# Patient Record
Sex: MALE | Race: WHITE | NOT HISPANIC OR LATINO | Employment: OTHER | URBAN - NONMETROPOLITAN AREA
[De-identification: names, ages, dates, MRNs, and addresses within clinical notes are randomized per-mention and may not be internally consistent; named-entity substitution may affect disease eponyms.]

---

## 2017-02-16 DIAGNOSIS — I48.20 CHRONIC ATRIAL FIBRILLATION (HCC): ICD-10-CM

## 2017-03-23 ENCOUNTER — OFFICE VISIT (OUTPATIENT)
Dept: CARDIOLOGY | Facility: CLINIC | Age: 77
End: 2017-03-23
Payer: MEDICARE

## 2017-03-23 VITALS
DIASTOLIC BLOOD PRESSURE: 82 MMHG | HEART RATE: 85 BPM | SYSTOLIC BLOOD PRESSURE: 140 MMHG | WEIGHT: 256 LBS | OXYGEN SATURATION: 96 % | BODY MASS INDEX: 31.83 KG/M2 | HEIGHT: 75 IN

## 2017-03-23 DIAGNOSIS — I48.20 CHRONIC ATRIAL FIBRILLATION (HCC): ICD-10-CM

## 2017-03-23 DIAGNOSIS — Z79.01 CHRONIC ANTICOAGULATION: ICD-10-CM

## 2017-03-23 DIAGNOSIS — I10 ESSENTIAL HYPERTENSION: ICD-10-CM

## 2017-03-23 DIAGNOSIS — I25.2 HISTORY OF MI (MYOCARDIAL INFARCTION): ICD-10-CM

## 2017-03-23 DIAGNOSIS — N18.30 CKD (CHRONIC KIDNEY DISEASE) STAGE 3, GFR 30-59 ML/MIN (HCC): ICD-10-CM

## 2017-03-23 DIAGNOSIS — E66.9 OBESITY (BMI 30.0-34.9): ICD-10-CM

## 2017-03-23 DIAGNOSIS — I25.10 CORONARY ARTERY DISEASE INVOLVING NATIVE CORONARY ARTERY OF NATIVE HEART WITHOUT ANGINA PECTORIS: ICD-10-CM

## 2017-03-23 DIAGNOSIS — G47.34 NOCTURNAL HYPOXEMIA: ICD-10-CM

## 2017-03-23 PROCEDURE — 99214 OFFICE O/P EST MOD 30 MIN: CPT | Performed by: INTERNAL MEDICINE

## 2017-03-23 PROCEDURE — 1036F TOBACCO NON-USER: CPT | Performed by: INTERNAL MEDICINE

## 2017-03-23 PROCEDURE — 1101F PT FALLS ASSESS-DOCD LE1/YR: CPT | Mod: 8P | Performed by: INTERNAL MEDICINE

## 2017-03-23 PROCEDURE — G8598 ASA/ANTIPLAT THER USED: HCPCS | Performed by: INTERNAL MEDICINE

## 2017-03-23 PROCEDURE — G8484 FLU IMMUNIZE NO ADMIN: HCPCS | Performed by: INTERNAL MEDICINE

## 2017-03-23 PROCEDURE — G8419 CALC BMI OUT NRM PARAM NOF/U: HCPCS | Performed by: INTERNAL MEDICINE

## 2017-03-23 PROCEDURE — G8432 DEP SCR NOT DOC, RNG: HCPCS | Performed by: INTERNAL MEDICINE

## 2017-03-23 PROCEDURE — 4040F PNEUMOC VAC/ADMIN/RCVD: CPT | Performed by: INTERNAL MEDICINE

## 2017-03-23 RX ORDER — LISINOPRIL AND HYDROCHLOROTHIAZIDE 20; 12.5 MG/1; MG/1
1 TABLET ORAL DAILY
Qty: 90 TAB | Refills: 3 | Status: SHIPPED | OUTPATIENT
Start: 2017-03-23 | End: 2017-09-07

## 2017-03-23 NOTE — MR AVS SNAPSHOT
"        Steven Bianchi   3/23/2017 10:00 AM   Office Visit   MRN: [id number]    Department:  Heart [facility]   Dept Phone:  [phone]    Description:  Male : 1940   Provider:  Josep Penn MD,[facility]           Reason for Visit     Follow-Up           Allergies as of 3/23/2017     No Known Allergies      You were diagnosed with     Coronary artery disease involving native coronary artery of native heart without angina pectoris   [5286977]       History of MI (myocardial infarction)   [938443]       Chronic atrial fibrillation (CMS-HCC)   [241438]       Chronic anticoagulation   [509738]       CKD (chronic kidney disease) stage 3, GFR 30-59 ml/min   [016708]       Obesity (BMI 30.0-34.9)   [482032]       Nocturnal hypoxemia   [233509]       Essential hypertension   [8837917]         Vital Signs     Blood Pressure Pulse Height Weight Body Mass Index Oxygen Saturation    140/82 mmHg 85 1.905 m (6' 3\") 116.121 kg (256 lb) 32.00 kg/m2 96%    Smoking Status                   Never Smoker            Basic Information     Date Of Birth Sex Race Ethnicity Preferred Language    1940 Male White Non- English      Your appointments     Aug 01, 2017 10:00 AM   FOLLOW UP with Josep Penn MD,[facility] ([facility]    [address] [place] [id number] [phone]              Problem List              ICD-10-CM Priority Class Noted - Resolved    BPH (benign prostatic hypertrophy) with urinary retention N40.1, R33.8   9/10/2012 - Present    Essential hypertension I10   9/10/2012 - Present    Elevated PSA R97.20   10/12/2012 - Present    CKD (chronic kidney disease) N18.9   10/12/2012 - Present    Back pain M54.9   2013 - Present    Colon cancer screening Z12.11   2013 - Present    Prostate cancer screening Z12.5   2013 - Present    Dyspnea R06.00   2013 - Present    Hematuria, gross R31.0   3/27/2014 - Present "    Knee pain, bilateral M25.561, M25.562   10/15/2014 - Present    Abnormal EKG R94.31   7/27/2015 - Present    Chronic atrial fibrillation (CMS-HCC) I48.2   8/14/2015 - Present      Health Maintenance        Date Due Completion Dates    IMM DTaP/Tdap/Td Vaccine (1 - Tdap) 9/10/1959 ---    IMM ZOSTER VACCINE 9/10/2000 ---    IMM PNEUMOCOCCAL 65+ (ADULT) LOW/MEDIUM RISK SERIES (2 of 2 - PCV13) 3/27/2015 3/27/2014    IMM INFLUENZA (1) 9/1/2016 ---    COLONOSCOPY 8/19/2023 8/19/2013 (N/S), 6/1/2009 (N/S)    Override on 8/19/2013: (N/S)    Override on 6/1/2009: (N/S)            Current Immunizations     Pneumococcal polysaccharide vaccine (PPSV-23) 3/27/2014      Below and/or attached are the medications your provider expects you to take. Review all of your home medications and newly ordered medications with your provider and/or pharmacist. Follow medication instructions as directed by your provider and/or pharmacist. Please keep your medication list with you and share with your provider. Update the information when medications are discontinued, doses are changed, or new medications (including over-the-counter products) are added; and carry medication information at all times in the event of emergency situations     Allergies:  No Known Allergies          Medications  Valid as of: March 23, 2017 - 10:58 AM    Generic Name Brand Name Tablet Size Instructions for use    AmLODIPine Besylate (Tab) NORVASC 2.5 MG Take 1 Tab by mouth every day.        Aspirin (Tablet Delayed Response) EQ ASPIRIN ADULT LOW DOSE 81 MG TAKE ONE TABLET BY MOUTH ONCE DAILY        Atorvastatin Calcium (Tab) LIPITOR 10 MG Take 1 Tab by mouth every day.        Cholecalciferol (Tab) cholecalciferol 1000 UNIT Take 1,000 Units by mouth every day.        Doxazosin Mesylate (Tab) CARDURA 4 MG TAKE ONE TABLET BY MOUTH TWICE DAILY        Finasteride (Tab) PROSCAR 5 MG Take 5 mg by mouth every day.        K Phos [place]-Sod Phos Di & Mono (Tab) K-PHOS-NEUTRAL,  PHOSPHA 250 NEUTRAL 155-852-130 MG Take 1 Tab by mouth 2 times a day.        Lisinopril-Hydrochlorothiazide (Tab) PRINZIDE, ZESTORETIC 20-12.5 MG Take 1 Tab by mouth every day.        Potassium Chloride Shelby CR (Tab CR) K-DUR 10 MEQ Take 10 mEq by mouth every day.        Rivaroxaban (Tab) XARELTO 15 MG Take 1 Tab by mouth with dinner.        Triamterene-HCTZ (Tab) MAXZIDE-25/DYAZIDE 37.5-25 MG TAKE ONE TABLET BY MOUTH IN THE MORNING        .                 Medicines prescribed today were sent to:     [facility] [address] [place] [address] [facility]    [address] [facility] [address]    Phone: [phone] Fax: [phone]    Open 24 Hours?: No      Medication refill instructions:       If your prescription bottle indicates you have medication refills left, it is not necessary to call your provider’s office. Please contact your pharmacy and they will refill your medication.    If your prescription bottle indicates you do not have any refills left, you may request refills at any time through one of the following ways: The online [employer] system (except Urgent Care), by calling your provider’s office, or by asking your pharmacy to contact your provider’s office with a refill request. Medication refills are processed only during regular business hours and may not be available until the next business day. Your provider may request additional information or to have a follow-up visit with you prior to refilling your medication.   *Please Note: Medication refills are assigned a new Rx number when refilled electronically. Your pharmacy may indicate that no refills were authorized even though a new prescription for the same medication is available at the pharmacy. Please request the medicine by name with the pharmacy before contacting your provider for a refill.        Your To Do List     Future Labs/Procedures Complete By Expires    ECHOCARDIOGRAM COMP W/O CONT  As directed 3/23/2018         [employer] Status:  Patient Declined

## 2017-03-23 NOTE — PROGRESS NOTES
Chief Complaint   Patient presents with   • Follow-Up       This patient is an established male who is here today to discuss:  77 y/o male with CAD and prior MI with LVEF 40-45% and c/o chronic MAGALLON; HTN may be less well controlled; Using ACEI qod per Nephrologist    Patient Active Problem List    Diagnosis Date Noted   • Chronic atrial fibrillation (CMS-HCC) 08/14/2015   • Abnormal EKG 07/27/2015   • Knee pain, bilateral 10/15/2014   • Hematuria, gross 03/27/2014   • Prostate cancer screening 11/20/2013   • Dyspnea 11/20/2013   • Back pain 07/02/2013   • Colon cancer screening 07/02/2013   • Elevated PSA 10/12/2012   • CKD (chronic kidney disease) 10/12/2012   • BPH (benign prostatic hypertrophy) with urinary retention 09/10/2012   • Essential hypertension 09/10/2012       No past medical history on file.  Past Surgical History   Procedure Laterality Date   • Hemorrhoidectomy  1982   • Prostatectomy, radial  2005       Current Outpatient Prescriptions   Medication Sig Dispense Refill   • vitamin D (CHOLECALCIFEROL) 1000 UNIT Tab Take 1,000 Units by mouth every day.     • lisinopril-hydrochlorothiazide (PRINZIDE, ZESTORETIC) 20-12.5 MG per tablet Take 1 Tab by mouth every day. 90 Tab 3   • doxazosin (CARDURA) 4 MG Tab TAKE ONE TABLET BY MOUTH TWICE DAILY 180 Tab 1   • rivaroxaban (XARELTO) 15 MG Tab tablet Take 1 Tab by mouth with dinner. 90 Tab 3   • triamterene-hctz (MAXZIDE-25/DYAZIDE) 37.5-25 MG Tab TAKE ONE TABLET BY MOUTH IN THE MORNING 90 Tab 0   • EQ ASPIRIN ADULT LOW DOSE 81 MG EC tablet TAKE ONE TABLET BY MOUTH ONCE DAILY 90 Tab 3   • atorvastatin (LIPITOR) 10 MG Tab Take 1 Tab by mouth every day. 30 Tab 11   • amlodipine (NORVASC) 2.5 MG Tab Take 1 Tab by mouth every day. 90 Tab 3   • potassium chloride SA (K-DUR) 10 MEQ TBCR Take 10 mEq by mouth every day.     • finasteride (PROSCAR) 5 MG TABS Take 5 mg by mouth every day.     • phosphorus (PHOSPHA 250 NEUTRAL) 155-852-130 MG tablet Take 1 Tab by mouth 2  "times a day.       No current facility-administered medications for this visit.     Review of patient's allergies indicates no known allergies.      Review of Systems:     Constitutional: Denies fevers, Denies weight changes  Eyes: Denies changes in vision, no eye pain  Ears/Nose/Throat/Mouth: Denies nasal congestion or sore throat   Cardiovascular: Denies chest pain or palpitations   Respiratory: Denies shortness of breath , Denies cough  Gastrointestinal/Hepatic: Denies abdominal pain, nausea, vomiting, diarrhea, constipation or GI bleeding   Genitourinary: Denies bladder dysfunction, dysuria or frequency  Musculoskeletal/Rheum:   joint pain and swelling   Skin/Breast: Denies rash, denies breast lumps or discharge  Neurological: Denies headache, confusion, memory loss or focal weakness/parasthesias  Psychiatric: denies mood disorder   Endocrine: MetSyn X;  hx of diabetes or thyroid dysfunction  Heme/Oncology/Lymph Nodes: Denies enlarged lymph nodes, denies brusing or known bleeding disorder  Allergic/Immunologic: Denies hx of allergies      All other systems were reviewed and are negative (AMA/CMS criteria)      Blood pressure 140/82, pulse 85, height 1.905 m (6' 3\"), weight 116.121 kg (256 lb), SpO2 96 %.  General Appearance: Well developed, Well nourished, No acute distress, Non-toxic appearance.    HENT: Normocephalic, Atraumatic, Oropharynx moist mucous membranes, Dentition: Mallampati 4 OP, Nose normal.    Eyes: PERRLA, EOMI, Conjunctiva normal, No discharge.  Neck: Normal range of motion, No cervical tenderness, Supple, No stridor, no JVD 6.5 cm. No thyromegaly. No carotid bruit.  Cardiovascular: Normal heart rate, ABNormal IRR rhythm, nl S1, S2, no S3, S4; No gallops; No murmurs, No rubs, . Extremitites with intact distal pulses, no cyanosis, clubbing but trace LE edema. No heaves, thrills, HJR;  Peripheral pulses: carotid 2+, brachial 2+, radial 2+, ulnar 2+, femoral 2+, popliteal 1+, PT 1+, DP 1+;  Lungs: " Respiratory effort is normal. Normal breath sounds, breath sounds clear to auscultation bilaterally, no rales, no rhonchi, no wheezing.    Abdomen: Bowel sounds normal, Soft, No tenderness, No guarding, No rebound, No masses, No hepatosplenomegaly.  Skin: LE pre-tibial discoloration; Warm, Dry, No erythema, No rash, no induration or crepitus.  Neurologic: Numbness of feet; Alert & oriented x 3, Normal motor function, Normal sensory function, No focal deficits noted,  normal gait.  Psychiatric: Affect normal, Judgment normal, Mood normal    Results for MERCEDEZ DAILY (MRN [id number]) as of 3/23/2017 10:26   Ref. Range 8/19/2016 07:25 11/4/2016 10:40 12/15/2016 09:15 3/3/2017 09:40 3/16/2017 13:53   WBC Latest Ref Range: 4.8-10.8 K/uL    7.6    RBC Latest Ref Range: 4.70-6.10 M/uL    5.70    Hemoglobin Latest Ref Range: 14.0-18.0 g/dL    17.1    Hematocrit Latest Ref Range: 42.0-52.0 %    52.1 (H)    MCV Latest Ref Range: 80.0-94.0 fL    91.4    MCH Latest Ref Range: 27.0-31.0 pg    30.0    MCHC Latest Ref Range: 33.0-37.0 g/dL    32.8 (L)    RDW Latest Ref Range: 11.5-14.5 %    14.0    Platelet Count Latest Ref Range: 130-400 K/uL    191    MPV Latest Ref Range: 7.4-10.4 fL    9.4    Neutrophils Automated Latest Ref Range: 39.0-70.0 %    71.2 (H)    Abs Neutrophils Automated Latest Ref Range: 1.8-7.7 K/uL    5.5    Lymphocytes Automated Latest Ref Range: 21.0-50.0 %    20.3 (L)    Abs Lymph Automated Latest Ref Range: 1.2-4.8 K/uL    1.6    Eosinophils Automated Latest Ref Range: 0.0-5.0 %    0.8    Basophils Automated Latest Ref Range: 0.0-3.0 %    0.3    Monocytes Automated Latest Ref Range: 2.0-9.0 %    7.1    Eosinophil Count, Blood Latest Ref Range: 0.00-0.50 K/uL    0.06    Sodium Latest Ref Range: 136-145 mmol/L  141  141 140   Potassium Latest Ref Range: 3.5-5.1 mmol/L  3.6  4.2 3.8   Chloride Latest Ref Range:  mmol/L  104  105 105   Co2 Latest Ref Range: 21-32 mmol/L  23  29 26   Anion Gap Latest Ref  Range: 10-18 mmol/L     13   Glucose Latest Ref Range: 74-99 mg/dL  131 (H)  104 (H) 106 (H)   Bun Latest Ref Range: 7-18 mg/dL  24 (H)  26 (H) 29 (H)   Creatinine Latest Ref Range: 0.8-1.3 mg/dL  1.4 (H)  1.5 (H) 1.6 (H)   GFR If  Latest Ref Range: >60 mL/min/1.73 m 2  60  55 (A) 51 (A)   GFR If Non  Latest Ref Range: >60 mL/min/1.73 m 2  49 (A)  45 (A) 42 (A)   Calcium Latest Ref Range: 8.5-11.0 mg/dL  8.9  9.2 9.4   Albumin Latest Ref Range: 3.4-5.0 g/dL  3.7  3.9    Phosphorus Latest Ref Range: 2.6-4.7 mg/dL  2.3 (L)  2.4 (L)    Magnesium Latest Ref Range: 1.8-2.4 mg/dL    2.0    Uric Acid Latest Ref Range: 3.5-8.5 mg/dL  6.2      Glycohemoglobin Latest Ref Range: <6.0 % 5.2       Estim. Avg Glu Latest Units: mg/dL 103       Cholesterol,Tot Latest Ref Range: 120-200 mg/dL   149     Triglycerides Latest Ref Range: 0-150 mg/dL   74     HDL Latest Ref Range: 40.0-60.0 mg/dL   57.0     Non HDL Cholesterol Latest Ref Range:     92     LDL Latest Ref Range: <100 mg/dL   77     Chol-Hdl Ratio Unknown   2.61       Assessment and Plan.   76 y.o. male has above mentioned; Will change his BP med; other option will be increase Amlodipine; Will see hid home BP in 3 months;     1. Coronary artery disease involving native coronary artery of native heart without angina pectoris    - ECHOCARDIOGRAM COMP W/O CONT; Future    2. History of MI (myocardial infarction)    - ECHOCARDIOGRAM COMP W/O CONT; Future    3. Chronic atrial fibrillation (CMS-HCC)  Discussed risks and educated about the subject related matters      4. Chronic anticoagulation  No bleeding    5. CKD (chronic kidney disease) stage 3, GFR 30-59 ml/min  Reviewed and may take Lisinopril/HCVT qod as per his Nephrologist    6. Obesity (BMI 30.0-34.9)  stable    7. Nocturnal hypoxemia  O2 suppl; discussed possible sleep apnea but he wants to quit    8. Essential hypertension    - lisinopril-hydrochlorothiazide (PRINZIDE, ZESTORETIC)  20-12.5 MG per tablet; Take 1 Tab by mouth every day.  Dispense: 90 Tab; Refill: 3      1. Coronary artery disease involving native coronary artery of native heart without angina pectoris  ECHOCARDIOGRAM COMP W/O CONT   2. History of MI (myocardial infarction)  ECHOCARDIOGRAM COMP W/O CONT   3. Chronic atrial fibrillation (CMS-[facility])     4. Chronic anticoagulation     5. CKD (chronic kidney disease) stage 3, GFR 30-59 ml/min     6. Obesity (BMI 30.0-34.9)     7. Nocturnal hypoxemia     8. Essential hypertension  lisinopril-hydrochlorothiazide (PRINZIDE, ZESTORETIC) 20-12.5 MG per tablet

## 2017-03-23 NOTE — Clinical Note
[facility] [address],   [address]nd Floor  [place] [address]  Phone: [phone]  Fax: [phone]              Steven Bianchi  1940    Encounter Date: 3/23/2017    Torey Apodaca M.D.    Thank you for the referral. I had the pleasure of seeing Steven Bianchi today in cardiology clinic. I've attached my visit note below. If you have any questions please feel free to give me a call anytime.      Josep Penn MD, PhD, [facility]  Cardiology and Lipidology  [facility]                                                                PROGRESS NOTE:  Chief Complaint   Patient presents with   • Follow-Up       This patient is an established male who is here today to discuss:  77 y/o male with CAD and prior MI with LVEF 40-45% and c/o chronic MAGALLON; HTN may be less well controlled; Using ACEI qod per Nephrologist    Patient Active Problem List    Diagnosis Date Noted   • Chronic atrial fibrillation (CMS-HCC) 08/14/2015   • Abnormal EKG 07/27/2015   • Knee pain, bilateral 10/15/2014   • Hematuria, gross 03/27/2014   • Prostate cancer screening 11/20/2013   • Dyspnea 11/20/2013   • Back pain 07/02/2013   • Colon cancer screening 07/02/2013   • Elevated PSA 10/12/2012   • CKD (chronic kidney disease) 10/12/2012   • BPH (benign prostatic hypertrophy) with urinary retention 09/10/2012   • Essential hypertension 09/10/2012       No past medical history on file.  Past Surgical History   Procedure Laterality Date   • Hemorrhoidectomy  1982   • Prostatectomy, radial  2005       Current Outpatient Prescriptions   Medication Sig Dispense Refill   • vitamin D (CHOLECALCIFEROL) 1000 UNIT Tab Take 1,000 Units by mouth every day.     • lisinopril-hydrochlorothiazide (PRINZIDE, ZESTORETIC) 20-12.5 MG per tablet Take 1 Tab by mouth every day. 90 Tab 3   • doxazosin (CARDURA) 4 MG Tab TAKE ONE TABLET BY MOUTH TWICE DAILY 180 Tab 1   •  "rivaroxaban (XARELTO) 15 MG Tab tablet Take 1 Tab by mouth with dinner. 90 Tab 3   • triamterene-hctz (MAXZIDE-25/DYAZIDE) 37.5-25 MG Tab TAKE ONE TABLET BY MOUTH IN THE MORNING 90 Tab 0   • EQ ASPIRIN ADULT LOW DOSE 81 MG EC tablet TAKE ONE TABLET BY MOUTH ONCE DAILY 90 Tab 3   • atorvastatin (LIPITOR) 10 MG Tab Take 1 Tab by mouth every day. 30 Tab 11   • amlodipine (NORVASC) 2.5 MG Tab Take 1 Tab by mouth every day. 90 Tab 3   • potassium chloride SA (K-DUR) 10 MEQ TBCR Take 10 mEq by mouth every day.     • finasteride (PROSCAR) 5 MG TABS Take 5 mg by mouth every day.     • phosphorus (PHOSPHA 250 NEUTRAL) 155-852-130 MG tablet Take 1 Tab by mouth 2 times a day.       No current facility-administered medications for this visit.     Review of patient's allergies indicates no known allergies.      Review of Systems:     Constitutional: Denies fevers, Denies weight changes  Eyes: Denies changes in vision, no eye pain  Ears/Nose/Throat/Mouth: Denies nasal congestion or sore throat   Cardiovascular: Denies chest pain or palpitations   Respiratory: Denies shortness of breath , Denies cough  Gastrointestinal/Hepatic: Denies abdominal pain, nausea, vomiting, diarrhea, constipation or GI bleeding   Genitourinary: Denies bladder dysfunction, dysuria or frequency  Musculoskeletal/Rheum:   joint pain and swelling   Skin/Breast: Denies rash, denies breast lumps or discharge  Neurological: Denies headache, confusion, memory loss or focal weakness/parasthesias  Psychiatric: denies mood disorder   Endocrine: MetSyn X;  hx of diabetes or thyroid dysfunction  Heme/Oncology/Lymph Nodes: Denies enlarged lymph nodes, denies brusing or known bleeding disorder  Allergic/Immunologic: Denies hx of allergies      All other systems were reviewed and are negative (AMA/CMS criteria)      Blood pressure 140/82, pulse 85, height 1.905 m (6' 3\"), weight 116.121 kg (256 lb), SpO2 96 %.  General Appearance: Well developed, Well nourished, No " acute distress, Non-toxic appearance.    HENT: Normocephalic, Atraumatic, Oropharynx moist mucous membranes, Dentition: Mallampati 4 OP, Nose normal.    Eyes: PERRLA, EOMI, Conjunctiva normal, No discharge.  Neck: Normal range of motion, No cervical tenderness, Supple, No stridor, no JVD 6.5 cm. No thyromegaly. No carotid bruit.  Cardiovascular: Normal heart rate, ABNormal IRR rhythm, nl S1, S2, no S3, S4; No gallops; No murmurs, No rubs, . Extremitites with intact distal pulses, no cyanosis, clubbing but trace LE edema. No heaves, thrills, HJR;  Peripheral pulses: carotid 2+, brachial 2+, radial 2+, ulnar 2+, femoral 2+, popliteal 1+, PT 1+, DP 1+;  Lungs: Respiratory effort is normal. Normal breath sounds, breath sounds clear to auscultation bilaterally, no rales, no rhonchi, no wheezing.    Abdomen: Bowel sounds normal, Soft, No tenderness, No guarding, No rebound, No masses, No hepatosplenomegaly.  Skin: LE pre-tibial discoloration; Warm, Dry, No erythema, No rash, no induration or crepitus.  Neurologic: Numbness of feet; Alert & oriented x 3, Normal motor function, Normal sensory function, No focal deficits noted,  normal gait.  Psychiatric: Affect normal, Judgment normal, Mood normal    Results for MERCEDEZ DAILY (MRN [id number]) as of 3/23/2017 10:26   Ref. Range 8/19/2016 07:25 11/4/2016 10:40 12/15/2016 09:15 3/3/2017 09:40 3/16/2017 13:53   WBC Latest Ref Range: 4.8-10.8 K/uL    7.6    RBC Latest Ref Range: 4.70-6.10 M/uL    5.70    Hemoglobin Latest Ref Range: 14.0-18.0 g/dL    17.1    Hematocrit Latest Ref Range: 42.0-52.0 %    52.1 (H)    MCV Latest Ref Range: 80.0-94.0 fL    91.4    MCH Latest Ref Range: 27.0-31.0 pg    30.0    MCHC Latest Ref Range: 33.0-37.0 g/dL    32.8 (L)    RDW Latest Ref Range: 11.5-14.5 %    14.0    Platelet Count Latest Ref Range: 130-400 K/uL    191    MPV Latest Ref Range: 7.4-10.4 fL    9.4    Neutrophils Automated Latest Ref Range: 39.0-70.0 %    71.2 (H)    Abs Neutrophils  Automated Latest Ref Range: 1.8-7.7 K/uL    5.5    Lymphocytes Automated Latest Ref Range: 21.0-50.0 %    20.3 (L)    Abs Lymph Automated Latest Ref Range: 1.2-4.8 K/uL    1.6    Eosinophils Automated Latest Ref Range: 0.0-5.0 %    0.8    Basophils Automated Latest Ref Range: 0.0-3.0 %    0.3    Monocytes Automated Latest Ref Range: 2.0-9.0 %    7.1    Eosinophil Count, Blood Latest Ref Range: 0.00-0.50 K/uL    0.06    Sodium Latest Ref Range: 136-145 mmol/L  141  141 140   Potassium Latest Ref Range: 3.5-5.1 mmol/L  3.6  4.2 3.8   Chloride Latest Ref Range:  mmol/L  104  105 105   Co2 Latest Ref Range: 21-32 mmol/L  23  29 26   Anion Gap Latest Ref Range: 10-18 mmol/L     13   Glucose Latest Ref Range: 74-99 mg/dL  131 (H)  104 (H) 106 (H)   Bun Latest Ref Range: 7-18 mg/dL  24 (H)  26 (H) 29 (H)   Creatinine Latest Ref Range: 0.8-1.3 mg/dL  1.4 (H)  1.5 (H) 1.6 (H)   GFR If  Latest Ref Range: >60 mL/min/1.73 m 2  60  55 (A) 51 (A)   GFR If Non  Latest Ref Range: >60 mL/min/1.73 m 2  49 (A)  45 (A) 42 (A)   Calcium Latest Ref Range: 8.5-11.0 mg/dL  8.9  9.2 9.4   Albumin Latest Ref Range: 3.4-5.0 g/dL  3.7  3.9    Phosphorus Latest Ref Range: 2.6-4.7 mg/dL  2.3 (L)  2.4 (L)    Magnesium Latest Ref Range: 1.8-2.4 mg/dL    2.0    Uric Acid Latest Ref Range: 3.5-8.5 mg/dL  6.2      Glycohemoglobin Latest Ref Range: <6.0 % 5.2       Estim. Avg Glu Latest Units: mg/dL 103       Cholesterol,Tot Latest Ref Range: 120-200 mg/dL   149     Triglycerides Latest Ref Range: 0-150 mg/dL   74     HDL Latest Ref Range: 40.0-60.0 mg/dL   57.0     Non HDL Cholesterol Latest Ref Range:     92     LDL Latest Ref Range: <100 mg/dL   77     Chol-Hdl Ratio Unknown   2.61       Assessment and Plan.   76 y.o. male has above mentioned; Will change his BP med; other option will be increase Amlodipine; Will see hid home BP in 3 months;     1. Coronary artery disease involving native coronary artery  of native heart without angina pectoris    - ECHOCARDIOGRAM COMP W/O CONT; Future    2. History of MI (myocardial infarction)    - ECHOCARDIOGRAM COMP W/O CONT; Future    3. Chronic atrial fibrillation (CMS-[facility])  Discussed risks and educated about the subject related matters      4. Chronic anticoagulation  No bleeding    5. CKD (chronic kidney disease) stage 3, GFR 30-59 ml/min  Reviewed and may take Lisinopril/HCVT qod as per his Nephrologist    6. Obesity (BMI 30.0-34.9)  stable    7. Nocturnal hypoxemia  O2 suppl; discussed possible sleep apnea but he wants to quit    8. Essential hypertension    - lisinopril-hydrochlorothiazide (PRINZIDE, ZESTORETIC) 20-12.5 MG per tablet; Take 1 Tab by mouth every day.  Dispense: 90 Tab; Refill: 3      1. Coronary artery disease involving native coronary artery of native heart without angina pectoris  ECHOCARDIOGRAM COMP W/O CONT   2. History of MI (myocardial infarction)  ECHOCARDIOGRAM COMP W/O CONT   3. Chronic atrial fibrillation (CMS-[facility])     4. Chronic anticoagulation     5. CKD (chronic kidney disease) stage 3, GFR 30-59 ml/min     6. Obesity (BMI 30.0-34.9)     7. Nocturnal hypoxemia     8. Essential hypertension  lisinopril-hydrochlorothiazide (PRINZIDE, ZESTORETIC) 20-12.5 MG per tablet             Torey Apodaca M.D.  [address]  [facility] [place] [address]  VIA In Basket

## 2017-08-01 ENCOUNTER — OFFICE VISIT (OUTPATIENT)
Dept: CARDIOLOGY | Facility: CLINIC | Age: 77
End: 2017-08-01
Payer: MEDICARE

## 2017-08-01 VITALS
SYSTOLIC BLOOD PRESSURE: 118 MMHG | OXYGEN SATURATION: 97 % | DIASTOLIC BLOOD PRESSURE: 70 MMHG | HEIGHT: 75 IN | BODY MASS INDEX: 31.58 KG/M2 | HEART RATE: 89 BPM | WEIGHT: 254 LBS

## 2017-08-01 DIAGNOSIS — E66.9 OBESITY (BMI 30.0-34.9): ICD-10-CM

## 2017-08-01 DIAGNOSIS — I48.20 CHRONIC ATRIAL FIBRILLATION (HCC): ICD-10-CM

## 2017-08-01 DIAGNOSIS — N18.30 CKD (CHRONIC KIDNEY DISEASE) STAGE 3, GFR 30-59 ML/MIN (HCC): ICD-10-CM

## 2017-08-01 DIAGNOSIS — I25.2 HISTORY OF MI (MYOCARDIAL INFARCTION): ICD-10-CM

## 2017-08-01 DIAGNOSIS — I10 ESSENTIAL HYPERTENSION, BENIGN: ICD-10-CM

## 2017-08-01 DIAGNOSIS — I25.10 CORONARY ARTERY DISEASE INVOLVING NATIVE CORONARY ARTERY OF NATIVE HEART WITHOUT ANGINA PECTORIS: ICD-10-CM

## 2017-08-01 DIAGNOSIS — Z79.01 CHRONIC ANTICOAGULATION: ICD-10-CM

## 2017-08-01 PROCEDURE — 99214 OFFICE O/P EST MOD 30 MIN: CPT | Performed by: INTERNAL MEDICINE

## 2017-08-01 NOTE — Clinical Note
[facility] [address],   [address]  [place] [address]  Phone: [phone]  Fax: [phone]              Steven Bianchi  1940    Encounter Date: 8/1/2017    Torey Apodaca M.D.    Thank you for the referral. I had the pleasure of seeing Steven Bianchi today in cardiology clinic. I've attached my visit note below. If you have any questions please feel free to give me a call anytime.      Josep Penn MD, PhD, [facility]  Cardiology and Lipidology  [facility]                                                                    PROGRESS NOTE:  Chief Complaint   Patient presents with   • Follow-Up     6 month f/u HTN   PAF    This patient is an established male who is here today to discuss:  75 y/o male with CAD and prior MI with LVEF 40-45% and c/o chronic MAGALLON; HTN may be less well controlled; Using ACEI qod per Nephrologist    Patient Active Problem List    Diagnosis Date Noted   • Chronic atrial fibrillation (CMS-HCC) 08/14/2015   • Abnormal EKG 07/27/2015   • Knee pain, bilateral 10/15/2014   • Hematuria, gross 03/27/2014   • Prostate cancer screening 11/20/2013   • Dyspnea 11/20/2013   • Back pain 07/02/2013   • Colon cancer screening 07/02/2013   • Elevated PSA 10/12/2012   • CKD (chronic kidney disease) 10/12/2012   • BPH (benign prostatic hypertrophy) with urinary retention 09/10/2012   • Essential hypertension 09/10/2012       History reviewed. No pertinent past medical history.  Past Surgical History   Procedure Laterality Date   • Hemorrhoidectomy  1982   • Prostatectomy, radial  2005     Social History     Social History   • Marital Status:      Spouse Name: N/A   • Number of Children: N/A   • Years of Education: N/A     Social History Main Topics   • Smoking status: Never Smoker    • Smokeless tobacco: Never Used   • Alcohol Use: 1.0 oz/week     2 drink(s) per week   • Drug Use: No   •  Sexual Activity: Not Asked     Other Topics Concern   • None     Social History Narrative     Family History   Problem Relation Age of Onset   • Cancer Mother    • Heart Disease Father    • Cancer Sister    • Cancer Brother        Current Outpatient Prescriptions   Medication Sig Dispense Refill   • amlodipine (NORVASC) 2.5 MG Tab TAKE ONE TABLET BY MOUTH ONCE DAILY 90 Tab 2   • vitamin D (CHOLECALCIFEROL) 1000 UNIT Tab Take 1,000 Units by mouth every day.     • lisinopril-hydrochlorothiazide (PRINZIDE, ZESTORETIC) 20-12.5 MG per tablet Take 1 Tab by mouth every day. 90 Tab 3   • doxazosin (CARDURA) 4 MG Tab TAKE ONE TABLET BY MOUTH TWICE DAILY 180 Tab 1   • rivaroxaban (XARELTO) 15 MG Tab tablet Take 1 Tab by mouth with dinner. 90 Tab 3   • triamterene-hctz (MAXZIDE-25/DYAZIDE) 37.5-25 MG Tab TAKE ONE TABLET BY MOUTH IN THE MORNING 90 Tab 0   • EQ ASPIRIN ADULT LOW DOSE 81 MG EC tablet TAKE ONE TABLET BY MOUTH ONCE DAILY 90 Tab 3   • atorvastatin (LIPITOR) 10 MG Tab Take 1 Tab by mouth every day. 30 Tab 11   • potassium chloride SA (K-DUR) 10 MEQ TBCR Take 10 mEq by mouth every day.     • finasteride (PROSCAR) 5 MG TABS Take 5 mg by mouth every day.     • phosphorus (PHOSPHA 250 NEUTRAL) 155-852-130 MG tablet Take 1 Tab by mouth 2 times a day.       No current facility-administered medications for this visit.     Review of patient's allergies indicates no known allergies.    Review of Systems:     Constitutional: Denies fevers, Denies weight changes  Eyes: Denies changes in vision, no eye pain  Ears/Nose/Throat/Mouth: Denies nasal congestion or sore throat   Cardiovascular: Denies chest pain or palpitations   Respiratory: Denies shortness of breath , Denies cough  Gastrointestinal/Hepatic: Denies abdominal pain, nausea, vomiting, diarrhea, constipation or GI bleeding   Genitourinary: Denies bladder dysfunction, dysuria or frequency  Musculoskeletal/Rheum: Denies  joint pain and swelling   Skin/Breast: Denies rash,  "denies breast lumps or discharge  Neurological: Denies headache, confusion, memory loss or focal weakness/parasthesias  Psychiatric: denies mood disorder   Endocrine: denies hx of diabetes or thyroid dysfunction  Heme/Oncology/Lymph Nodes: Denies enlarged lymph nodes, denies brusing or known bleeding disorder  Allergic/Immunologic: Denies hx of allergies      All other systems were reviewed and are negative (AMA/CMS criteria)      Blood pressure 118/70, pulse 89, height 1.905 m (6' 3\"), weight 115.214 kg (254 lb), SpO2 97 %.  General Appearance: Well developed, Well nourished, No acute distress, Non-toxic appearance.    HENT: Normocephalic, Atraumatic, Oropharynx moist mucous membranes, Dentition: Mallampati 4 OP, Nose normal.    Eyes: PERRLA, EOMI, Conjunctiva normal, No discharge.  Neck: Normal range of motion, No cervical tenderness, Supple, No stridor, no JVD 6.5 cm. No thyromegaly. No carotid bruit.  Cardiovascular: Normal heart rate, ABNormal IRR rhythm, nl S1, S2, no S3, S4; No gallops; No murmurs, No rubs, . Extremitites with intact distal pulses, no cyanosis, clubbing but trace LE edema. No heaves, thrills, HJR;  Peripheral pulses: carotid 2+, brachial 2+, radial 2+, ulnar 2+, femoral 2+, popliteal 1+, PT 1+, DP 1+;  Lungs: Respiratory effort is normal. Normal breath sounds, breath sounds clear to auscultation bilaterally, no rales, no rhonchi, no wheezing.    Abdomen: Bowel sounds normal, Soft, No tenderness, No guarding, No rebound, No masses, No hepatosplenomegaly.  Skin: LE pre-tibial discoloration; Warm, Dry, No erythema, No rash, no induration or crepitus.  Neurologic: Numbness of feet; Alert & oriented x 3, Normal motor function, Normal sensory function, No focal deficits noted,  normal gait.  Psychiatric: Affect normal, Judgment normal, Mood normal    Results for MERCEDEZ DAILY (MRN [id number]) as of 8/1/2017 10:11   Ref. Range 3/3/2017 09:40 3/16/2017 13:53 7/10/2017 09:25 7/17/2017 00:00 7/24/2017 " 10:46   WBC Latest Ref Range: 4.8-10.8 K/uL 7.6       RBC Latest Ref Range: 4.70-6.10 M/uL 5.70       Hemoglobin Latest Ref Range: 14.0-18.0 g/dL 17.1       Hematocrit Latest Ref Range: 42.0-52.0 % 52.1 (H)       MCV Latest Ref Range: 80.0-94.0 fL 91.4       MCH Latest Ref Range: 27.0-31.0 pg 30.0       MCHC Latest Ref Range: 33.0-37.0 g/dL 32.8 (L)       RDW Latest Ref Range: 11.5-14.5 % 14.0       Platelet Count Latest Ref Range: 130-400 K/uL 191       MPV Latest Ref Range: 7.4-10.4 fL 9.4       Neutrophils Automated Latest Ref Range: 39.0-70.0 % 71.2 (H)       Abs Neutrophils Automated Latest Ref Range: 1.8-7.7 K/uL 5.5       Lymphocytes Automated Latest Ref Range: 21.0-50.0 % 20.3 (L)       Abs Lymph Automated Latest Ref Range: 1.2-4.8 K/uL 1.6       Eosinophils Automated Latest Ref Range: 0.0-5.0 % 0.8       Basophils Automated Latest Ref Range: 0.0-3.0 % 0.3       Monocytes Automated Latest Ref Range: 2.0-9.0 % 7.1       Eosinophil Count, Blood Latest Ref Range: 0.00-0.50 K/uL 0.06       Sodium Latest Ref Range: 136-145 mmol/L 141 140 140  140   Potassium Latest Ref Range: 3.5-5.1 mmol/L 4.2 3.8 4.0  3.9   Chloride Latest Ref Range:  mmol/L 105 105 103  104   Co2 Latest Ref Range: 21-32 mmol/L 29 26 27  25   Anion Gap Latest Ref Range: 10-18 mmol/L  13      Glucose Latest Ref Range: 74-99 mg/dL 104 (H) 106 (H) 101 (H)  108 (H)   Bun Latest Ref Range: 7-18 mg/dL 26 (H) 29 (H) 29 (H)  29 (H)   Creatinine Latest Ref Range: 0.8-1.3 mg/dL 1.5 (H) 1.6 (H) 1.7 (H)  1.7 (H)   GFR If  Latest Ref Range: >60 mL/min/1.73 m 2 55 (A) 51 (A) 48 (A)  48 (A)   GFR If Non  Latest Ref Range: >60 mL/min/1.73 m 2 45 (A) 42 (A) 39 (A)  39 (A)   Calcium Latest Ref Range: 8.5-11.0 mg/dL 9.2 9.4 9.5  9.6   Albumin Latest Ref Range: 3.4-5.0 g/dL 3.9  3.9  3.9   Phosphorus Latest Ref Range: 2.6-4.7 mg/dL 2.4 (L)  3.2  2.4 (L)   Magnesium Latest Ref Range: 1.8-2.4 mg/dL 2.0       Uric Acid Latest Ref  Range: 3.5-8.5 mg/dL   8.1       Assessment and Plan.   76 y.o. male has some hay fever with some pressure in his head but o/w doing well; Rare palpitation, fleeting chest discomfort;   Abn echo showed RWMA; LVEF 50% and enlarged ascending aorta 3.8 cm;  Pls see orders for ischemia w/u's     1. Coronary artery disease involving native coronary artery of native heart without angina pectoris  See above    2. History of MI (myocardial infarction)  See above    3. Chronic atrial fibrillation (CMS-HCC)  Rare palpitation    4. Chronic anticoagulation  No bleeding    5. Essential hypertension, benign  controlled    6. CKD (chronic kidney disease) stage 3, GFR 30-59 ml/min  reviewed    7. Obesity (BMI 30.0-34.9)  stable      Return to clinic in  2  months    1. Coronary artery disease involving native coronary artery of native heart without angina pectoris     2. History of MI (myocardial infarction)     3. Chronic atrial fibrillation (CMS-HCC)     4. Chronic anticoagulation     5. Essential hypertension, benign     6. CKD (chronic kidney disease) stage 3, GFR 30-59 ml/min     7. Obesity (BMI 30.0-34.9)               Torey Apodaca M.D.  [address]  [facility] [address]  VIA In Basket

## 2017-08-01 NOTE — PROGRESS NOTES
Chief Complaint   Patient presents with   • Follow-Up     6 month f/u HTN   PAF    This patient is an established male who is here today to discuss:  77 y/o male with CAD and prior MI with LVEF 40-45% and c/o chronic MAGALLON; HTN may be less well controlled; Using ACEI qod per Nephrologist    Patient Active Problem List    Diagnosis Date Noted   • Chronic atrial fibrillation (CMS-HCC) 08/14/2015   • Abnormal EKG 07/27/2015   • Knee pain, bilateral 10/15/2014   • Hematuria, gross 03/27/2014   • Prostate cancer screening 11/20/2013   • Dyspnea 11/20/2013   • Back pain 07/02/2013   • Colon cancer screening 07/02/2013   • Elevated PSA 10/12/2012   • CKD (chronic kidney disease) 10/12/2012   • BPH (benign prostatic hypertrophy) with urinary retention 09/10/2012   • Essential hypertension 09/10/2012       History reviewed. No pertinent past medical history.  Past Surgical History   Procedure Laterality Date   • Hemorrhoidectomy  1982   • Prostatectomy, radial  2005     Social History     Social History   • Marital Status:      Spouse Name: N/A   • Number of Children: N/A   • Years of Education: N/A     Social History Main Topics   • Smoking status: Never Smoker    • Smokeless tobacco: Never Used   • Alcohol Use: 1.0 oz/week     2 drink(s) per week   • Drug Use: No   • Sexual Activity: Not Asked     Other Topics Concern   • None     Social History Narrative     Family History   Problem Relation Age of Onset   • Cancer Mother    • Heart Disease Father    • Cancer Sister    • Cancer Brother        Current Outpatient Prescriptions   Medication Sig Dispense Refill   • amlodipine (NORVASC) 2.5 MG Tab TAKE ONE TABLET BY MOUTH ONCE DAILY 90 Tab 2   • vitamin D (CHOLECALCIFEROL) 1000 UNIT Tab Take 1,000 Units by mouth every day.     • lisinopril-hydrochlorothiazide (PRINZIDE, ZESTORETIC) 20-12.5 MG per tablet Take 1 Tab by mouth every day. 90 Tab 3   • doxazosin (CARDURA) 4 MG Tab TAKE ONE TABLET BY MOUTH TWICE DAILY 180 Tab 1  "  • rivaroxaban (XARELTO) 15 MG Tab tablet Take 1 Tab by mouth with dinner. 90 Tab 3   • triamterene-hctz (MAXZIDE-25/DYAZIDE) 37.5-25 MG Tab TAKE ONE TABLET BY MOUTH IN THE MORNING 90 Tab 0   • EQ ASPIRIN ADULT LOW DOSE 81 MG EC tablet TAKE ONE TABLET BY MOUTH ONCE DAILY 90 Tab 3   • atorvastatin (LIPITOR) 10 MG Tab Take 1 Tab by mouth every day. 30 Tab 11   • potassium chloride SA (K-DUR) 10 MEQ TBCR Take 10 mEq by mouth every day.     • finasteride (PROSCAR) 5 MG TABS Take 5 mg by mouth every day.     • phosphorus (PHOSPHA 250 NEUTRAL) 155-852-130 MG tablet Take 1 Tab by mouth 2 times a day.       No current facility-administered medications for this visit.     Review of patient's allergies indicates no known allergies.    Review of Systems:     Constitutional: Denies fevers, Denies weight changes  Eyes: Denies changes in vision, no eye pain  Ears/Nose/Throat/Mouth: Denies nasal congestion or sore throat   Cardiovascular: Denies chest pain or palpitations   Respiratory: Denies shortness of breath , Denies cough  Gastrointestinal/Hepatic: Denies abdominal pain, nausea, vomiting, diarrhea, constipation or GI bleeding   Genitourinary: Denies bladder dysfunction, dysuria or frequency  Musculoskeletal/Rheum: Denies  joint pain and swelling   Skin/Breast: Denies rash, denies breast lumps or discharge  Neurological: Denies headache, confusion, memory loss or focal weakness/parasthesias  Psychiatric: denies mood disorder   Endocrine: denies hx of diabetes or thyroid dysfunction  Heme/Oncology/Lymph Nodes: Denies enlarged lymph nodes, denies brusing or known bleeding disorder  Allergic/Immunologic: Denies hx of allergies      All other systems were reviewed and are negative (AMA/CMS criteria)      Blood pressure 118/70, pulse 89, height 1.905 m (6' 3\"), weight 115.214 kg (254 lb), SpO2 97 %.  General Appearance: Well developed, Well nourished, No acute distress, Non-toxic appearance.    HENT: Normocephalic, Atraumatic, " Oropharynx moist mucous membranes, Dentition: Mallampati 4 OP, Nose normal.    Eyes: PERRLA, EOMI, Conjunctiva normal, No discharge.  Neck: Normal range of motion, No cervical tenderness, Supple, No stridor, no JVD 6.5 cm. No thyromegaly. No carotid bruit.  Cardiovascular: Normal heart rate, ABNormal IRR rhythm, nl S1, S2, no S3, S4; No gallops; No murmurs, No rubs, . Extremitites with intact distal pulses, no cyanosis, clubbing but trace LE edema. No heaves, thrills, HJR;  Peripheral pulses: carotid 2+, brachial 2+, radial 2+, ulnar 2+, femoral 2+, popliteal 1+, PT 1+, DP 1+;  Lungs: Respiratory effort is normal. Normal breath sounds, breath sounds clear to auscultation bilaterally, no rales, no rhonchi, no wheezing.    Abdomen: Bowel sounds normal, Soft, No tenderness, No guarding, No rebound, No masses, No hepatosplenomegaly.  Skin: LE pre-tibial discoloration; Warm, Dry, No erythema, No rash, no induration or crepitus.  Neurologic: Numbness of feet; Alert & oriented x 3, Normal motor function, Normal sensory function, No focal deficits noted,  normal gait.  Psychiatric: Affect normal, Judgment normal, Mood normal    Results for MERCEDE[facility] DAILY (MRN [id number]) as of 8/1/2017 10:11   Ref. Range 3/3/2017 09:40 3/16/2017 13:53 7/10/2017 09:25 7/17/2017 00:00 7/24/2017 10:46   WBC Latest Ref Range: 4.8-10.8 K/uL 7.6       RBC Latest Ref Range: 4.70-6.10 M/uL 5.70       Hemoglobin Latest Ref Range: 14.0-18.0 g/dL 17.1       Hematocrit Latest Ref Range: 42.0-52.0 % 52.1 (H)       MCV Latest Ref Range: 80.0-94.0 fL 91.4       MCH Latest Ref Range: 27.0-31.0 pg 30.0       MCHC Latest Ref Range: 33.0-37.0 g/dL 32.8 (L)       RDW Latest Ref Range: 11.5-14.5 % 14.0       Platelet Count Latest Ref Range: 130-400 K/uL 191       MPV Latest Ref Range: 7.4-10.4 fL 9.4       Neutrophils Automated Latest Ref Range: 39.0-70.0 % 71.2 (H)       Abs Neutrophils Automated Latest Ref Range: 1.8-7.7 K/uL 5.5       Lymphocytes Automated  Latest Ref Range: 21.0-50.0 % 20.3 (L)       Abs Lymph Automated Latest Ref Range: 1.2-4.8 K/uL 1.6       Eosinophils Automated Latest Ref Range: 0.0-5.0 % 0.8       Basophils Automated Latest Ref Range: 0.0-3.0 % 0.3       Monocytes Automated Latest Ref Range: 2.0-9.0 % 7.1       Eosinophil Count, Blood Latest Ref Range: 0.00-0.50 K/uL 0.06       Sodium Latest Ref Range: 136-145 mmol/L 141 140 140  140   Potassium Latest Ref Range: 3.5-5.1 mmol/L 4.2 3.8 4.0  3.9   Chloride Latest Ref Range:  mmol/L 105 105 103  104   Co2 Latest Ref Range: 21-32 mmol/L 29 26 27  25   Anion Gap Latest Ref Range: 10-18 mmol/L  13      Glucose Latest Ref Range: 74-99 mg/dL 104 (H) 106 (H) 101 (H)  108 (H)   Bun Latest Ref Range: 7-18 mg/dL 26 (H) 29 (H) 29 (H)  29 (H)   Creatinine Latest Ref Range: 0.8-1.3 mg/dL 1.5 (H) 1.6 (H) 1.7 (H)  1.7 (H)   GFR If  Latest Ref Range: >60 mL/min/1.73 m 2 55 (A) 51 (A) 48 (A)  48 (A)   GFR If Non  Latest Ref Range: >60 mL/min/1.73 m 2 45 (A) 42 (A) 39 (A)  39 (A)   Calcium Latest Ref Range: 8.5-11.0 mg/dL 9.2 9.4 9.5  9.6   Albumin Latest Ref Range: 3.4-5.0 g/dL 3.9  3.9  3.9   Phosphorus Latest Ref Range: 2.6-4.7 mg/dL 2.4 (L)  3.2  2.4 (L)   Magnesium Latest Ref Range: 1.8-2.4 mg/dL 2.0       Uric Acid Latest Ref Range: 3.5-8.5 mg/dL   8.1       Assessment and Plan.   76 y.o. male has some hay fever with some pressure in his head but o/w doing well; Rare palpitation, fleeting chest discomfort;   Abn echo showed RWMA; LVEF 50% and enlarged ascending aorta 3.8 cm;  Pls see orders for ischemia w/u's     1. Coronary artery disease involving native coronary artery of native heart without angina pectoris  See above    2. History of MI (myocardial infarction)  See above    3. Chronic atrial fibrillation (CMS-HCC)  Rare palpitation    4. Chronic anticoagulation  No bleeding    5. Essential hypertension, benign  controlled    6. CKD (chronic kidney disease) stage 3,  GFR 30-59 ml/min  reviewed    7. Obesity (BMI 30.0-34.9)  stable      Return to clinic in  2  months    1. Coronary artery disease involving native coronary artery of native heart without angina pectoris     2. History of MI (myocardial infarction)     3. Chronic atrial fibrillation (CMS-[facility])     4. Chronic anticoagulation     5. Essential hypertension, benign     6. CKD (chronic kidney disease) stage 3, GFR 30-59 ml/min     7. Obesity (BMI 30.0-34.9)

## 2017-08-01 NOTE — MR AVS SNAPSHOT
"Steven Bianchi   2017 10:00 AM   Office Visit   MRN: [id number]    [facility]   Dept Phone:  [phone]    Description:  Male : 1940   Provider:  Josep Penn MD,[facility]           Reason for Visit     Follow-Up 6 month f/u HTN      Allergies as of 2017     No Known Allergies      You were diagnosed with     Coronary artery disease involving native coronary artery of native heart without angina pectoris   [1137879]       History of MI (myocardial infarction)   [139343]       Chronic atrial fibrillation (CMS-HCC)   [775814]       Chronic anticoagulation   [407734]       Essential hypertension, benign   [401.1.ICD-9-CM]       CKD (chronic kidney disease) stage 3, GFR 30-59 ml/min   [431526]       Obesity (BMI 30.0-34.9)   [004714]         Vital Signs     Blood Pressure Pulse Height Weight Body Mass Index Oxygen Saturation    118/70 mmHg 89 1.905 m (6' 3\") 115.214 kg (254 lb) 31.75 kg/m2 97%    Smoking Status                   Never Smoker            Basic Information     Date Of Birth Sex Race Ethnicity Preferred Language    1940 Male White Non- English      Your appointments     Oct 19, 2017 10:40 AM   FOLLOW UP with [facility] Penn MD,[facility] (--[facility]    [address] [place] [id number] [phone]              Problem List              ICD-10-CM Priority Class Noted - Resolved    BPH (benign prostatic hypertrophy) with urinary retention N40.1, R33.8   9/10/2012 - Present    Essential hypertension I10   9/10/2012 - Present    Elevated PSA R97.20   10/12/2012 - Present    CKD (chronic kidney disease) N18.9   10/12/2012 - Present    Back pain M54.9   2013 - Present    Colon cancer screening Z12.11   2013 - Present    Prostate cancer screening Z12.5   2013 - Present    Dyspnea R06.00   2013 - Present    Hematuria, gross R31.0   3/27/2014 - Present    Knee " pain, bilateral M25.561, M25.562   10/15/2014 - Present    Abnormal EKG R94.31   7/27/2015 - Present    Chronic atrial fibrillation (CMS-HCC) I48.2   8/14/2015 - Present      Health Maintenance        Date Due Completion Dates    IMM DTaP/Tdap/Td Vaccine (1 - Tdap) 9/10/1959 ---    IMM ZOSTER VACCINE 9/10/2000 ---    IMM PNEUMOCOCCAL 65+ (ADULT) LOW/MEDIUM RISK SERIES (2 of 2 - PCV13) 3/27/2015 3/27/2014    IMM INFLUENZA (1) 9/1/2017 ---    COLONOSCOPY 8/19/2023 8/19/2013 (N/S), 6/1/2009 (N/S)    Override on 8/19/2013: (N/S)    Override on 6/1/2009: (N/S)            Current Immunizations     Pneumococcal polysaccharide vaccine (PPSV-23) 3/27/2014      Below and/or attached are the medications your provider expects you to take. Review all of your home medications and newly ordered medications with your provider and/or pharmacist. Follow medication instructions as directed by your provider and/or pharmacist. Please keep your medication list with you and share with your provider. Update the information when medications are discontinued, doses are changed, or new medications (including over-the-counter products) are added; and carry medication information at all times in the event of emergency situations     Allergies:  No Known Allergies          Medications  Valid as of: August 01, 2017 - 10:27 AM    Generic Name Brand Name Tablet Size Instructions for use    AmLODIPine Besylate (Tab) NORVASC 2.5 MG TAKE ONE TABLET BY MOUTH ONCE DAILY        Aspirin (Tablet Delayed Response) EQ ASPIRIN ADULT LOW DOSE 81 MG TAKE ONE TABLET BY MOUTH ONCE DAILY        Atorvastatin Calcium (Tab) LIPITOR 10 MG Take 1 Tab by mouth every day.        Cholecalciferol (Tab) cholecalciferol 1000 UNIT Take 1,000 Units by mouth every day.        Doxazosin Mesylate (Tab) CARDURA 4 MG TAKE ONE TABLET BY MOUTH TWICE DAILY        Finasteride (Tab) PROSCAR 5 MG Take 5 mg by mouth every day.        K Phos Barton-Sod Phos Di & Mono (Tab) K-PHOS-NEUTRAL,  PHOSPHA 250 NEUTRAL 155-852-130 MG Take 1 Tab by mouth 2 times a day.        Lisinopril-Hydrochlorothiazide (Tab) PRINZIDE, ZESTORETIC 20-12.5 MG Take 1 Tab by mouth every day.        Potassium Chloride Shelby CR (Tab CR) K-DUR 10 MEQ Take 10 mEq by mouth every day.        Rivaroxaban (Tab) XARELTO 15 MG Take 1 Tab by mouth with dinner.        Triamterene-HCTZ (Tab) MAXZIDE-25/DYAZIDE 37.5-25 MG TAKE ONE TABLET BY MOUTH IN THE MORNING        .                 Medicines prescribed today were sent to:     [facility] [address] [place] [address] [address] [facility]    [address] [facility] [address]    Phone: [phone] Fax: [phone]    Open 24 Hours?: No      Medication refill instructions:       If your prescription bottle indicates you have medication refills left, it is not necessary to call your provider’s office. Please contact your pharmacy and they will refill your medication.    If your prescription bottle indicates you do not have any refills left, you may request refills at any time through one of the following ways: The online [employer]) system (except Urgent Care), by calling your provider’s office, or by asking your pharmacy to contact your provider’s office with a refill request. Medication refills are processed only during regular business hours and may not be available until the next business day. Your provider may request additional information or to have a follow-up visit with you prior to refilling your medication.   *Please Note: Medication refills are assigned a new Rx number when refilled electronically. Your pharmacy may indicate that no refills were authorized even though a new prescription for the same medication is available at the pharmacy. Please request the medicine by name with the pharmacy before contacting your provider for a refill.        Your To Do List     Future Labs/Procedures Complete By Expires    NM-HEART MUSCLE IMAGE,SPECT MULT  As directed 1/29/2018         [employer]  Access Code: [email or web address]  Expires: 8/9/2017  9:15 AM    [employer]  A secure, online tool to manage your health information     [employer]’s [employer]® is a secure, online tool that connects you to your personalized health information from the privacy of your home -- day or night - making it very easy for you to manage your healthcare. Once the activation process is completed, you can even access your medical information using the [employer] annel, which is available for free in the Apple Annel store or Google Play store.     [employer] provides the following levels of access (as shown below):   My Chart Features   [facility] Primary Care Doctor [facility]  Specialists [facility]  Urgent  Care Non-[facility]  Primary Care  Doctor   Email your healthcare team securely and privately 24/7 X X X    Manage appointments: schedule your next appointment; view details of past/upcoming appointments X      Request prescription refills. X      View recent personal medical records, including lab and immunizations X X X X   View health record, including health history, allergies, medications X X X X   Read reports about your outpatient visits, procedures, consult and ER notes X X X X   See your discharge summary, which is a recap of your hospital and/or ER visit that includes your diagnosis, lab results, and care plan. X X       How to register for [employer]:  1. Go to  [email or web address].  2. Click on the Sign Up Now box, which takes you to the New Member Sign Up page. You will need to provide the following information:  a. Enter your [employer] Access Code exactly as it appears at the top of this page. (You will not need to use this code after you’ve completed the sign-up process. If you do not sign up before the expiration date, you must request a new code.)   b. Enter your date of birth.   c. Enter your home email address.   d. Click Submit, and follow the next screen’s instructions.  3. Create a [employer] ID. This will be your [employer] login ID  and cannot be changed, so think of one that is secure and easy to remember.  4. Create a [employer] password. You can change your password at any time.  5. Enter your Password Reset Question and Answer. This can be used at a later time if you forget your password.   6. Enter your e-mail address. This allows you to receive e-mail notifications when new information is available in [employer]  7. Click Sign Up. You can now view your health information.    For assistance activating your [employer] account, call [phone]

## 2017-09-06 PROBLEM — M1A.19X0 LEAD-INDUCED CHRONIC GOUT OF MULTIPLE SITES WITHOUT TOPHUS: Status: ACTIVE | Noted: 2017-09-06

## 2017-09-06 PROBLEM — R73.09 ELEVATED GLUCOSE: Status: ACTIVE | Noted: 2017-09-06

## 2017-09-06 PROBLEM — T56.0X1A LEAD-INDUCED CHRONIC GOUT OF MULTIPLE SITES WITHOUT TOPHUS: Status: ACTIVE | Noted: 2017-09-06

## 2017-10-26 ENCOUNTER — OFFICE VISIT (OUTPATIENT)
Dept: CARDIOLOGY | Facility: CLINIC | Age: 77
End: 2017-10-26
Payer: MEDICARE

## 2017-10-26 VITALS
HEIGHT: 75 IN | HEART RATE: 73 BPM | SYSTOLIC BLOOD PRESSURE: 144 MMHG | WEIGHT: 255 LBS | BODY MASS INDEX: 31.71 KG/M2 | OXYGEN SATURATION: 96 % | DIASTOLIC BLOOD PRESSURE: 90 MMHG

## 2017-10-26 DIAGNOSIS — I48.20 CHRONIC ATRIAL FIBRILLATION (HCC): ICD-10-CM

## 2017-10-26 DIAGNOSIS — Z79.01 CHRONIC ANTICOAGULATION: ICD-10-CM

## 2017-10-26 DIAGNOSIS — E66.9 OBESITY (BMI 30.0-34.9): ICD-10-CM

## 2017-10-26 DIAGNOSIS — I25.2 HISTORY OF MI (MYOCARDIAL INFARCTION): ICD-10-CM

## 2017-10-26 DIAGNOSIS — G47.34 NOCTURNAL HYPOXEMIA: ICD-10-CM

## 2017-10-26 DIAGNOSIS — I10 ESSENTIAL HYPERTENSION, BENIGN: ICD-10-CM

## 2017-10-26 DIAGNOSIS — I25.10 CORONARY ARTERY DISEASE INVOLVING NATIVE CORONARY ARTERY OF NATIVE HEART WITHOUT ANGINA PECTORIS: ICD-10-CM

## 2017-10-26 PROCEDURE — 99214 OFFICE O/P EST MOD 30 MIN: CPT | Performed by: INTERNAL MEDICINE

## 2017-10-26 RX ORDER — TRIAMTERENE AND HYDROCHLOROTHIAZIDE 37.5; 25 MG/1; MG/1
1 TABLET ORAL EVERY MORNING
Qty: 90 TAB | Refills: 3 | Status: ON HOLD | OUTPATIENT
Start: 2017-10-26 | End: 2018-03-14

## 2017-10-26 NOTE — PROGRESS NOTES
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 No pertinent past medical history.  Past Surgical History:   Procedure Laterality Date   • PROSTATECTOMY, RADIAL  2005   • HEMORRHOIDECTOMY  1982     Social History     Social History   • Marital status:      Spouse name: N/A   • Number of children: N/A   • Years of education: N/A     Social History Main Topics   • Smoking status: Never Smoker   • Smokeless tobacco: Never Used   • Alcohol use 1.0 oz/week     2 Standard drinks or equivalent per week   • Drug use: No   • Sexual activity: Not on file     Other Topics Concern   • Not on file     Social History Narrative   • No narrative on file     Family History   Problem Relation Age of Onset   • Cancer Mother    • Heart Disease Father    • Cancer Sister    • Cancer Brother        Current Outpatient Prescriptions   Medication Sig Dispense Refill   • lisinopril (PRINIVIL) 20 MG Tab Take 1 Tab by mouth every day. 90 Tab 3   • amlodipine (NORVASC) 2.5 MG Tab TAKE ONE TABLET BY MOUTH ONCE DAILY 90 Tab 2   • vitamin D (CHOLECALCIFEROL) 1000 UNIT Tab Take 1,000 Units by mouth every day.     • doxazosin (CARDURA) 4 MG Tab TAKE ONE TABLET  "BY MOUTH TWICE DAILY 180 Tab 1   • rivaroxaban (XARELTO) 15 MG Tab tablet Take 1 Tab by mouth with dinner. 90 Tab 3   • EQ ASPIRIN ADULT LOW DOSE 81 MG EC tablet TAKE ONE TABLET BY MOUTH ONCE DAILY 90 Tab 3   • atorvastatin (LIPITOR) 10 MG Tab Take 1 Tab by mouth every day. 30 Tab 11   • potassium chloride SA (K-DUR) 10 MEQ TBCR Take 10 mEq by mouth every day.     • triamterene-hctz (MAXZIDE-25/DYAZIDE) 37.5-25 MG Tab TAKE ONE TABLET BY MOUTH IN THE MORNING 90 Tab 0     No current facility-administered medications for this visit.      Review of patient's allergies indicates no known allergies.    Review of Systems:     Constitutional: Denies fevers, Denies weight changes  Eyes: Denies changes in vision, no eye pain  Ears/Nose/Throat/Mouth: Denies nasal congestion or sore throat   Cardiovascular: Denies chest pain or palpitations   Respiratory: Denies shortness of breath , Denies cough  Gastrointestinal/Hepatic: Denies abdominal pain, nausea, vomiting, diarrhea, constipation or GI bleeding   Genitourinary: Denies bladder dysfunction, dysuria or frequency  Musculoskeletal/Rheum: Denies  joint pain and swelling   Skin/Breast: Denies rash, denies breast lumps or discharge  Neurological: Denies headache, confusion, memory loss or focal weakness/parasthesias  Psychiatric: denies mood disorder   Endocrine: denies hx of diabetes or thyroid dysfunction  Heme/Oncology/Lymph Nodes: Denies enlarged lymph nodes, denies brusing or known bleeding disorder  Allergic/Immunologic: Denies hx of allergies      All other systems were reviewed and are negative (AMA/CMS criteria)      Blood pressure 144/90, pulse 73, height 1.905 m (6' 3\"), weight 115.7 kg (255 lb), SpO2 96 %.  General Appearance: Well developed, Well nourished, No acute distress, Non-toxic appearance.    HENT: Normocephalic, Atraumatic, Oropharynx moist mucous membranes, Dentition: Mallampati 4 OP, Nose normal.    Eyes: PERRLA, EOMI, Conjunctiva normal, No " discharge.  Neck: Normal range of motion, No cervical tenderness, Supple, No stridor, no JVD 6.5 cm. No thyromegaly. No carotid bruit.  Cardiovascular: Normal heart rate, ABNormal IRR rhythm, nl S1, S2, no S3, S4; No gallops; No murmurs, No rubs, . Extremitites with intact distal pulses, no cyanosis, clubbing but trace LE edema. No heaves, thrills, HJR;  Peripheral pulses: carotid 2+, brachial 2+, radial 2+, ulnar 2+, femoral 2+, popliteal 1+, PT 1+, DP 1+;  Lungs: Respiratory effort is normal. Normal breath sounds, breath sounds clear to auscultation bilaterally, no rales, no rhonchi, no wheezing.    Abdomen: Bowel sounds normal, Soft, No tenderness, No guarding, No rebound, No masses, No hepatosplenomegaly.  Skin: LE pre-tibial discoloration; Warm, Dry, No erythema, No rash, no induration or crepitus.  Neurologic: Numbness of feet; Alert & oriented x 3, Normal motor function, Normal sensory function, No focal deficits noted,  normal gait.  Psychiatric: Affect normal, Judgment normal, Mood normal    MPI stress test, 8/2017 1.  Moderate fixed perfusion defect in the inferior wall. No reversible perfusion defect seen.  2.  Decreased wall motion in the inferior wall.  3.  Ejection fraction 44%.    7/18/2017 Echo LVEF 50% with inf hypokinesis, Asc aorta 3.8 cm    Results for MERCEDEZ DAILY GARRETT (MRN [id number]) as of 10/26/2017 09:18   Ref. Range 9/6/2017 16:11 10/9/2017 09:05   Hemoglobin Latest Ref Range: 14.0 - 18.0 g/dL  16.5   Hematocrit Latest Ref Range: 42.0 - 52.0 %  49.7   Sodium Latest Ref Range: 136 - 145 mmol/L  141   Potassium Latest Ref Range: 3.5 - 5.1 mmol/L  4.0   Chloride Latest Ref Range: 98 - 107 mmol/L  104   Co2 Latest Ref Range: 21 - 32 mmol/L  27   Glucose Latest Ref Range: 74 - 99 mg/dL  100 (H)   Bun Latest Ref Range: 7 - 18 mg/dL  34 (H)   Creatinine Latest Ref Range: 0.8 - 1.3 mg/dL  1.6 (H)   GFR If  Latest Ref Range: >60 mL/min/1.73 m 2  51 (A)   GFR If Non   Latest Ref Range: >60 mL/min/1.73 m 2  42 (A)   Calcium Latest Ref Range: 8.5 - 11.0 mg/dL  9.6   Albumin Latest Ref Range: 3.4 - 5.0 g/dL  4.0   Phosphorus Latest Ref Range: 2.6 - 4.7 mg/dL  2.5 (L)   Magnesium Latest Ref Range: 1.8 - 2.4 mg/dL  2.0   Uric Acid Latest Ref Range: 3.5 - 8.5 mg/dL  7.5   Glycohemoglobin Unknown 5.8      Assessment and Plan.   77 y.o. male is doing well; Needs Rx refill;     1. Coronary artery disease involving native coronary artery of native heart without angina pectoris  asx    2. History of MI (myocardial infarction)  asx    3. Chronic atrial fibrillation (CMS-[facility])  No palpitation    4. Chronic anticoagulation  No bleeding unless injury    5. Essential hypertension, benign  controlled    6. Obesity (BMI 30.0-34.9)  stable    7. Nocturnal hypoxemia  O2 suppl      Return to clinic in  6 , months    1. Coronary artery disease involving native coronary artery of native heart without angina pectoris     2. History of MI (myocardial infarction)     3. Chronic atrial fibrillation (CMS-[facility])     4. Chronic anticoagulation     5. Essential hypertension, benign     6. Obesity (BMI 30.0-34.9)     7. Nocturnal hypoxemia

## 2017-10-26 NOTE — LETTER
[facility] [address],   [address]  [place] [address]  Phone: [phone]  Fax: [phone]              Steven Bianchi  1940    Encounter Date: 10/26/2017    Torey Apodaca M.D.    Thank you for the referral. I had the pleasure of seeing Steven Bianchi today in cardiology clinic. I've attached my visit note below. If you have any questions please feel free to give me a call anytime.      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 No pertinent past medical history.  Past Surgical History:   Procedure Laterality Date   • PROSTATECTOMY, RADIAL  2005   • HEMORRHOIDECTOMY  1982     Social History     Social History   • Marital status:      Spouse name: N/A   • Number of children: N/A   • Years of education: N/A     Social History Main Topics   • Smoking status: Never Smoker   • Smokeless tobacco: Never Used   • Alcohol use 1.0 oz/week     2 Standard drinks or  equivalent per week   • Drug use: No   • Sexual activity: Not on file     Other Topics Concern   • Not on file     Social History Narrative   • No narrative on file     Family History   Problem Relation Age of Onset   • Cancer Mother    • Heart Disease Father    • Cancer Sister    • Cancer Brother        Current Outpatient Prescriptions   Medication Sig Dispense Refill   • lisinopril (PRINIVIL) 20 MG Tab Take 1 Tab by mouth every day. 90 Tab 3   • amlodipine (NORVASC) 2.5 MG Tab TAKE ONE TABLET BY MOUTH ONCE DAILY 90 Tab 2   • vitamin D (CHOLECALCIFEROL) 1000 UNIT Tab Take 1,000 Units by mouth every day.     • doxazosin (CARDURA) 4 MG Tab TAKE ONE TABLET BY MOUTH TWICE DAILY 180 Tab 1   • rivaroxaban (XARELTO) 15 MG Tab tablet Take 1 Tab by mouth with dinner. 90 Tab 3   • EQ ASPIRIN ADULT LOW DOSE 81 MG EC tablet TAKE ONE TABLET BY MOUTH ONCE DAILY 90 Tab 3   • atorvastatin (LIPITOR) 10 MG Tab Take 1 Tab by mouth every day. 30 Tab 11   • potassium chloride SA (K-DUR) 10 MEQ TBCR Take 10 mEq by mouth every day.     • triamterene-hctz (MAXZIDE-25/DYAZIDE) 37.5-25 MG Tab TAKE ONE TABLET BY MOUTH IN THE MORNING 90 Tab 0     No current facility-administered medications for this visit.      Review of patient's allergies indicates no known allergies.    Review of Systems:     Constitutional: Denies fevers, Denies weight changes  Eyes: Denies changes in vision, no eye pain  Ears/Nose/Throat/Mouth: Denies nasal congestion or sore throat   Cardiovascular: Denies chest pain or palpitations   Respiratory: Denies shortness of breath , Denies cough  Gastrointestinal/Hepatic: Denies abdominal pain, nausea, vomiting, diarrhea, constipation or GI bleeding   Genitourinary: Denies bladder dysfunction, dysuria or frequency  Musculoskeletal/Rheum: Denies  joint pain and swelling   Skin/Breast: Denies rash, denies breast lumps or discharge  Neurological: Denies headache, confusion, memory loss or focal weakness/parasthesias  Psychiatric:  "denies mood disorder   Endocrine: denies hx of diabetes or thyroid dysfunction  Heme/Oncology/Lymph Nodes: Denies enlarged lymph nodes, denies brusing or known bleeding disorder  Allergic/Immunologic: Denies hx of allergies      All other systems were reviewed and are negative (AMA/CMS criteria)      Blood pressure 144/90, pulse 73, height 1.905 m (6' 3\"), weight 115.7 kg (255 lb), SpO2 96 %.  General Appearance: Well developed, Well nourished, No acute distress, Non-toxic appearance.    HENT: Normocephalic, Atraumatic, Oropharynx moist mucous membranes, Dentition: Mallampati 4 OP, Nose normal.    Eyes: PERRLA, EOMI, Conjunctiva normal, No discharge.  Neck: Normal range of motion, No cervical tenderness, Supple, No stridor, no JVD 6.5 cm. No thyromegaly. No carotid bruit.  Cardiovascular: Normal heart rate, ABNormal IRR rhythm, nl S1, S2, no S3, S4; No gallops; No murmurs, No rubs, . Extremitites with intact distal pulses, no cyanosis, clubbing but trace LE edema. No heaves, thrills, HJR;  Peripheral pulses: carotid 2+, brachial 2+, radial 2+, ulnar 2+, femoral 2+, popliteal 1+, PT 1+, DP 1+;  Lungs: Respiratory effort is normal. Normal breath sounds, breath sounds clear to auscultation bilaterally, no rales, no rhonchi, no wheezing.    Abdomen: Bowel sounds normal, Soft, No tenderness, No guarding, No rebound, No masses, No hepatosplenomegaly.  Skin: LE pre-tibial discoloration; Warm, Dry, No erythema, No rash, no induration or crepitus.  Neurologic: Numbness of feet; Alert & oriented x 3, Normal motor function, Normal sensory function, No focal deficits noted,  normal gait.  Psychiatric: Affect normal, Judgment normal, Mood normal    MPI stress test, 8/2017 1.  Moderate fixed perfusion defect in the inferior wall. No reversible perfusion defect seen.  2.  Decreased wall motion in the inferior wall.  3.  Ejection fraction 44%.    7/18/2017 Echo LVEF 50% with inf hypokinesis, Asc aorta 3.8 cm    Results for " MERCEDEZ DAILY (MRN [id number]) as of 10/26/2017 09:18   Ref. Range 9/6/2017 16:11 10/9/2017 09:05   Hemoglobin Latest Ref Range: 14.0 - 18.0 g/dL  16.5   Hematocrit Latest Ref Range: 42.0 - 52.0 %  49.7   Sodium Latest Ref Range: 136 - 145 mmol/L  141   Potassium Latest Ref Range: 3.5 - 5.1 mmol/L  4.0   Chloride Latest Ref Range: 98 - 107 mmol/L  104   Co2 Latest Ref Range: 21 - 32 mmol/L  27   Glucose Latest Ref Range: 74 - 99 mg/dL  100 (H)   Bun Latest Ref Range: 7 - 18 mg/dL  34 (H)   Creatinine Latest Ref Range: 0.8 - 1.3 mg/dL  1.6 (H)   GFR If  Latest Ref Range: >60 mL/min/1.73 m 2  51 (A)   GFR If Non  Latest Ref Range: >60 mL/min/1.73 m 2  42 (A)   Calcium Latest Ref Range: 8.5 - 11.0 mg/dL  9.6   Albumin Latest Ref Range: 3.4 - 5.0 g/dL  4.0   Phosphorus Latest Ref Range: 2.6 - 4.7 mg/dL  2.5 (L)   Magnesium Latest Ref Range: 1.8 - 2.4 mg/dL  2.0   Uric Acid Latest Ref Range: 3.5 - 8.5 mg/dL  7.5   Glycohemoglobin Unknown 5.8      Assessment and Plan.   77 y.o. male is doing well; Needs Rx refill;     1. Coronary artery disease involving native coronary artery of native heart without angina pectoris  asx    2. History of MI (myocardial infarction)  asx    3. Chronic atrial fibrillation (CMS-HCC)  No palpitation    4. Chronic anticoagulation  No bleeding unless injury    5. Essential hypertension, benign  controlled    6. Obesity (BMI 30.0-34.9)  stable    7. Nocturnal hypoxemia  O2 suppl      Return to clinic in  6 , months    1. Coronary artery disease involving native coronary artery of native heart without angina pectoris     2. History of MI (myocardial infarction)     3. Chronic atrial fibrillation (CMS-HCC)     4. Chronic anticoagulation     5. Essential hypertension, benign     6. Obesity (BMI 30.0-34.9)     7. Nocturnal hypoxemia           Torey Apodaca M.D.  [address]  [place] [address]  VIA In Basket

## 2017-12-01 DIAGNOSIS — I48.20 CHRONIC ATRIAL FIBRILLATION (HCC): ICD-10-CM

## 2018-01-09 DIAGNOSIS — I10 ESSENTIAL HYPERTENSION, BENIGN: ICD-10-CM

## 2018-01-09 RX ORDER — AMLODIPINE BESYLATE 2.5 MG/1
2.5 TABLET ORAL DAILY
Qty: 90 TAB | Refills: 2 | Status: SHIPPED | OUTPATIENT
Start: 2018-01-09 | End: 2018-02-15 | Stop reason: SDUPTHER

## 2018-02-07 ENCOUNTER — TELEPHONE (OUTPATIENT)
Dept: CARDIOLOGY | Facility: MEDICAL CENTER | Age: 78
End: 2018-02-07

## 2018-02-07 NOTE — TELEPHONE ENCOUNTER
"----- Message from Deirdre Norman sent at 2/7/2018  2:16 PM PST -----  Regarding: xarelto advice  Contact: [phone]  GURWINDER/davin    Pt's wife Lady calling to discuss xarelto per order urologist in [place].  Pt was recently hospitalized in [place] due to inability to urinate and loss of blood in the urine. Pt is home now, is catheterized & continues to pass blood clots thru his urine.  Pt may have to have a \"TURP\" so Lady needs to talk about the xarelto, please call Lady at .    "

## 2018-02-07 NOTE — TELEPHONE ENCOUNTER
"Agree with note from .    Patient was in the hospital in [place] 1/29-2/1.  During that time he was taken off Xarelto and ASA for blood in urine.      Per wife Lady:     \"The ER tried 7 times to catheterize the patient and he was \"almost\" sent for a TURP.  He had lost a lot of blood and was crossed matched in case he needed it (he did not).   He was in \"a lot\" of atrial fib during that time and also had trouble breathing.  When he was discharged he was doing better and was told to wait 5 days before resuming blood thinners. (He still has a catheter).  Yesterday he had no blood in his urine, but during the night and this am he has blood in urine-- which cleared up to only clots floating in his urine-- to totally clear at this time..  Urology told her to call us and find out if we need to restart the blood thinners.  Urology prefers we not restart.\"    Appt for Dr. Villarreal (Urology) here in [place] is for 2/13 (Tuesday).  Patient used to see Dr. Penn.  To Dr. Martin to advise further.   "

## 2018-02-08 NOTE — TELEPHONE ENCOUNTER
Betty Awad M.D.  Marcelle Cohen R.N.   Caller: Unspecified (Today,  3:27 PM)             May leave off Xarelto a little longer, restart once hematuria resolved   Should try resume baby aspirin

## 2018-02-08 NOTE — TELEPHONE ENCOUNTER
Wife says no more problems today.  Will start ASA tonight.  If no bleeding overnight, will start Xarelto tomorrow; otherwise, will hold off and let me know.  I will touch base with her on Friday.     Appt made with Cielo Call for next week in [place].  Appt made with Dr. Dai in March in [place].    Wife will bring records from [place] with her on AB appt.      Cielo- would you like to get labs on him prior to Wed. next week?  Urologist may be ordering lab too, but may not be ready for your appt.

## 2018-02-14 ENCOUNTER — TELEPHONE (OUTPATIENT)
Dept: VASCULAR LAB | Facility: MEDICAL CENTER | Age: 78
End: 2018-02-14

## 2018-02-14 ENCOUNTER — APPOINTMENT (OUTPATIENT)
Dept: CARDIOLOGY | Facility: PHYSICIAN GROUP | Age: 78
End: 2018-02-14
Attending: NURSE PRACTITIONER
Payer: MEDICARE

## 2018-02-14 ENCOUNTER — ANTICOAGULATION MONITORING (OUTPATIENT)
Dept: VASCULAR LAB | Facility: MEDICAL CENTER | Age: 78
End: 2018-02-14

## 2018-02-14 ENCOUNTER — OFFICE VISIT (OUTPATIENT)
Dept: CARDIOLOGY | Facility: PHYSICIAN GROUP | Age: 78
End: 2018-02-14
Payer: MEDICARE

## 2018-02-14 VITALS
WEIGHT: 240 LBS | HEART RATE: 82 BPM | HEIGHT: 75 IN | OXYGEN SATURATION: 96 % | SYSTOLIC BLOOD PRESSURE: 100 MMHG | DIASTOLIC BLOOD PRESSURE: 50 MMHG | BODY MASS INDEX: 29.84 KG/M2

## 2018-02-14 DIAGNOSIS — I48.20 CHRONIC ATRIAL FIBRILLATION (HCC): ICD-10-CM

## 2018-02-14 DIAGNOSIS — E78.2 MIXED HYPERLIPIDEMIA: ICD-10-CM

## 2018-02-14 DIAGNOSIS — R33.8 BENIGN PROSTATIC HYPERPLASIA WITH URINARY RETENTION: ICD-10-CM

## 2018-02-14 DIAGNOSIS — I10 ESSENTIAL HYPERTENSION: ICD-10-CM

## 2018-02-14 DIAGNOSIS — R94.30 ABNORMAL CARDIAC FUNCTION TEST: ICD-10-CM

## 2018-02-14 DIAGNOSIS — Z79.01 CHRONIC ANTICOAGULATION: ICD-10-CM

## 2018-02-14 DIAGNOSIS — N18.2 STAGE 2 CHRONIC KIDNEY DISEASE: ICD-10-CM

## 2018-02-14 DIAGNOSIS — I49.5 TACHY-BRADY SYNDROME (HCC): ICD-10-CM

## 2018-02-14 DIAGNOSIS — N40.1 BENIGN PROSTATIC HYPERPLASIA WITH URINARY RETENTION: ICD-10-CM

## 2018-02-14 DIAGNOSIS — R31.0 HEMATURIA, GROSS: ICD-10-CM

## 2018-02-14 PROBLEM — E78.5 HYPERLIPIDEMIA: Status: ACTIVE | Noted: 2018-02-14

## 2018-02-14 PROCEDURE — 99214 OFFICE O/P EST MOD 30 MIN: CPT | Performed by: NURSE PRACTITIONER

## 2018-02-14 RX ORDER — OXYBUTYNIN CHLORIDE 5 MG/1
TABLET ORAL
COMMUNITY
Start: 2018-02-01 | End: 2018-02-14

## 2018-02-14 RX ORDER — THIAMINE MONONITRATE (VIT B1) 100 MG
100 TABLET ORAL DAILY
COMMUNITY
End: 2018-03-16

## 2018-02-14 RX ORDER — POTASSIUM CHLORIDE 750 MG/1
10 TABLET, FILM COATED, EXTENDED RELEASE ORAL DAILY
Qty: 1 TAB | Refills: 1 | COMMUNITY
Start: 2018-02-14

## 2018-02-14 RX ORDER — POTASSIUM CHLORIDE 750 MG/1
TABLET, FILM COATED, EXTENDED RELEASE ORAL
COMMUNITY
Start: 2018-02-06 | End: 2018-02-14 | Stop reason: SDUPTHER

## 2018-02-14 RX ORDER — OXYBUTYNIN CHLORIDE 5 MG/1
5 TABLET ORAL 3 TIMES DAILY
Qty: 1 TAB | Refills: 1 | Status: ON HOLD | COMMUNITY
Start: 2018-02-14 | End: 2018-03-13

## 2018-02-14 RX ORDER — FINASTERIDE 5 MG/1
TABLET, FILM COATED ORAL
COMMUNITY
Start: 2017-11-29 | End: 2018-02-14 | Stop reason: SDUPTHER

## 2018-02-14 RX ORDER — DOXAZOSIN MESYLATE 4 MG/1
4 TABLET ORAL 2 TIMES DAILY
Qty: 1 TAB | Refills: 1 | COMMUNITY
Start: 2018-02-14 | End: 2021-03-19

## 2018-02-14 RX ORDER — LISINOPRIL AND HYDROCHLOROTHIAZIDE 20; 12.5 MG/1; MG/1
1 TABLET ORAL DAILY
Qty: 1 TAB | Refills: 1 | Status: ON HOLD | COMMUNITY
Start: 2018-02-14 | End: 2018-03-14

## 2018-02-14 RX ORDER — LISINOPRIL AND HYDROCHLOROTHIAZIDE 20; 12.5 MG/1; MG/1
TABLET ORAL
COMMUNITY
Start: 2018-01-04 | End: 2018-02-14

## 2018-02-14 RX ORDER — FINASTERIDE 5 MG/1
5 TABLET, FILM COATED ORAL DAILY
Qty: 1 TAB | Refills: 1 | COMMUNITY
Start: 2018-02-14 | End: 2019-11-11

## 2018-02-14 RX ORDER — CHOLECALCIFEROL (VITAMIN D3) 25 MCG
1 CAPSULE ORAL DAILY
Qty: 1 CAP | Refills: 1 | COMMUNITY
Start: 2018-02-14 | End: 2021-03-19

## 2018-02-14 ASSESSMENT — ENCOUNTER SYMPTOMS
PALPITATIONS: 0
NAUSEA: 0
COUGH: 0
ORTHOPNEA: 0
MYALGIAS: 0
PND: 0
ABDOMINAL PAIN: 0
LOSS OF CONSCIOUSNESS: 0
HEADACHES: 0
DIZZINESS: 1
BRUISES/BLEEDS EASILY: 0
CHILLS: 0
SHORTNESS OF BREATH: 1
FEVER: 0

## 2018-02-14 NOTE — PROGRESS NOTES
Subjective:   Steven Bianchi is a 77 y.o. male who presents today for hospital follow-up of hematuria.    Steven is a 77 year old male with history of chronic atrial fibrillation, anticoagulated with Xarelto, hypertension and hyperlipidemia, previously followed by Dr. Penn. MPI in August 2017 showed moderate fixed defect in inferior wall and LVEF 44%.  He also has chronic kidney disease (followed by Dr. Grossman) and BPH (followed by Dr. Villarreal).    More recently, he has had some urination issues, and was down in [place] visiting friends.  He was not able to urinate, and [place] catheter was attempted multiple times.  They were finally able to pass a catheter, but he has subsequent hematuria. Xarelto was stopped for a few days.  While hospitalized, he was on the cardiac monitor, and apparently had some tachy/boyd episodes.    He is here today for follow-up. He does admit to some dizziness and lightheadedness, but no actual syncope. Some dyspnea on exertion, but no overt chest pain or discomfort. No symptomatic palpitations. No orthopnea or PND; he does use oxygen at nighttime. Hematuria has stopped.    Past Medical History:   Diagnosis Date   • Abnormal cardiac function test 08/2017    MPI with moderate fixed defect inferior wall, LVEF 44%.   • Back pain    • BPH (benign prostatic hyperplasia)     Followed by urology ([facility])   • Chronic anticoagulation    • Chronic atrial fibrillation (CMS-[facility]) 07/2017    Echocardiogram with normal LV size, mild septal hypertrophy, LVEF 50%. Inferior/apical hypokinesis. Moderately dilated RV. Enlarged RA and LA. Mild MR, mild TR. RVSP 20mmHg. Ascending aorta 3.8cm   • Chronic kidney disease     Followed by nephrology (Chauncey)   • Hematuria    • Hyperlipidemia    • Hypertension    • Knee pain      Past Surgical History:   Procedure Laterality Date   • PROSTATECTOMY, RADIAL  2005   • HEMORRHOIDECTOMY  1982     Family History   Problem Relation Age of Onset   • Cancer Mother    • Heart  "Disease Father    • Cancer Sister    • Cancer Brother      History   Smoking Status   • Never Smoker   Smokeless Tobacco   • Never Used     No Known Allergies  Outpatient Encounter Prescriptions as of 2/14/2018   Medication Sig Dispense Refill   • oxybutynin (DITROPAN) 5 MG Tab      • thiamine (THIAMINE) 100 MG Tab Take 100 mg by mouth every day.     • amlodipine (NORVASC) 2.5 MG Tab Take 1 Tab by mouth every day. 90 Tab 2   • [DISCONTINUED] rivaroxaban (XARELTO) 15 MG Tab tablet Take 1 Tab by mouth with dinner. 90 Tab 3   • triamterene-hctz (MAXZIDE-25/DYAZIDE) 37.5-25 MG Tab Take 1 Tab by mouth every morning. 90 Tab 3   • lisinopril (PRINIVIL) 20 MG Tab Take 1 Tab by mouth every day. 90 Tab 3   • vitamin D (CHOLECALCIFEROL) 1000 UNIT Tab Take 1,000 Units by mouth every day.     • doxazosin (CARDURA) 4 MG Tab TAKE ONE TABLET BY MOUTH TWICE DAILY 180 Tab 1   • atorvastatin (LIPITOR) 10 MG Tab Take 1 Tab by mouth every day. 30 Tab 11   • [DISCONTINUED] potassium chloride SA (K-DUR) 10 MEQ TBCR Take 10 mEq by mouth every day.     • [DISCONTINUED] EQ ASPIRIN ADULT LOW DOSE 81 MG EC tablet TAKE ONE TABLET BY MOUTH ONCE DAILY 90 Tab 3     No facility-administered encounter medications on file as of 2/14/2018.      Review of Systems   Constitutional: Negative for chills and fever.   Respiratory: Positive for shortness of breath. Negative for cough.    Cardiovascular: Negative for chest pain, palpitations, orthopnea, leg swelling and PND.   Gastrointestinal: Negative for abdominal pain and nausea.   Musculoskeletal: Negative for myalgias.   Neurological: Positive for dizziness. Negative for loss of consciousness and headaches.   Endo/Heme/Allergies: Does not bruise/bleed easily.        Objective:   /50   Pulse 82   Ht 1.905 m (6' 3\")   Wt 108.9 kg (240 lb)   SpO2 96%   BMI 30.00 kg/m²     Physical Exam   Constitutional: He is oriented to person, place, and time. He appears well-developed and well-nourished. No " distress.   HENT:   Head: Normocephalic.   Eyes: Conjunctivae and EOM are normal.   Neck: Normal range of motion. Neck supple. No JVD present.   Cardiovascular: Normal rate, normal heart sounds and intact distal pulses.  An irregularly irregular rhythm present. Exam reveals no gallop and no friction rub.    No murmur heard.  Pulmonary/Chest: Effort normal and breath sounds normal.   Abdominal: Soft. Bowel sounds are normal.   Musculoskeletal: Normal range of motion. He exhibits no edema.   Neurological: He is alert and oriented to person, place, and time.   Skin: Skin is warm and dry. No rash noted. There is erythema.   Skin changes of chronic venous insufficiency.   Psychiatric: He has a normal mood and affect. His behavior is normal.     EKG today reveals atrial fibrillation at a rate of  bpm.    Assessment:     1. 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 Chronic atrial fibrillation (CMS-HCC)  EKG    HOLTER MONITOR / EVENT RECORDER   4. Chronic anticoagulation     5. Abnormal cardiac function test     6. Mixed hyperlipidemia     7. BPH (benign prostatic hypertrophy) with urinary retention     8. Stage 2 chronic kidney disease  BASIC METABOLIC PANEL       Medical Decision Making:  Today's Assessment / Status / Plan:     1. Hematuria, now resolved.  Could try switching from Xarelto to Eliquis, and see if this helps.  Samples are given today.    2. Recent tachy-boyd syndrome with history of chronic atrial fibrillation. To obtain ZioPatch, to see what HR is doing over time, given symptoms of dizziness/lightheadedness.    3. Chronic anticoagulation, as above, to switch from Xarelto to low dose Eliquis.    4. History of abnormal MPI, with fixed inferior wall defect and LVEF 44%.  He does have some dyspnea on exertion as well; consider workup at FU with Dr. Dai.    5. Hyperlipidemia, treated.    6. BPH, followed by urology. He may be needing repeat TURP  soon.    7. Chronic kidney disease, followed by Miguel Grossman. Needs BMP done; he does have various duplicate medications, and needs to assess potassium status and renal function. To be done tomorrow.    8. Hypertension, treated. Again, he does have duplicates listed for BP medications; I will have my nurse call him at home today or tomorrow to reconcile medications.    Keep FU on 3/15/2018 with Dr. Dai. Plan as above: Labs, ZioPatch and switch Xarelto to Eliquis.    Collaborating MD: Zaki

## 2018-02-14 NOTE — LETTER
[facility]   [address] [place] [id number]  Phone: [phone]  Fax: [phone]              Steven Bianchi  1940    Encounter Date: 2/14/2018    SHIVA Tompkins          PROGRESS NOTE:  Subjective:   Steven Bianchi is a 77 y.o. male who presents today for hospital follow-up of hematuria.    Steven is a 77 year old male with history of chronic atrial fibrillation, anticoagulated with Xarelto, hypertension and hyperlipidemia, previously followed by Dr. Penn. MPI in August 2017 showed moderate fixed defect in inferior wall and LVEF 44%.  He also has chronic kidney disease (followed by Dr. Grossman) and BPH (followed by Dr. Villarreal).    More recently, he has had some urination issues, and was down in [place] visiting friends.  He was not able to urinate, and [place] catheter was attempted multiple times.  They were finally able to pass a catheter, but he has subsequent hematuria. Xarelto was stopped for a few days.  While hospitalized, he was on the cardiac monitor, and apparently had some tachy/boyd episodes.    He is here today for follow-up. He does admit to some dizziness and lightheadedness, but no actual syncope. Some dyspnea on exertion, but no overt chest pain or discomfort. No symptomatic palpitations. No orthopnea or PND; he does use oxygen at nighttime. Hematuria has stopped.    Past Medical History:   Diagnosis Date   • Abnormal cardiac function test 08/2017    MPI with moderate fixed defect inferior wall, LVEF 44%.   • Back pain    • BPH (benign prostatic hyperplasia)     Followed by urology ([facility])   • Chronic anticoagulation    • Chronic atrial fibrillation (CMS-[facility]) 07/2017    Echocardiogram with normal LV size, mild septal hypertrophy, LVEF 50%. Inferior/apical hypokinesis. Moderately dilated RV. Enlarged RA and LA. Mild MR, mild TR. RVSP 20mmHg. Ascending aorta 3.8cm   • Chronic kidney disease     Followed by nephrology  (Chauncey)   • Hematuria    • Hyperlipidemia    • Hypertension    • Knee pain      Past Surgical History:   Procedure Laterality Date   • PROSTATECTOMY, RADIAL  2005   • HEMORRHOIDECTOMY  1982     Family History   Problem Relation Age of Onset   • Cancer Mother    • Heart Disease Father    • Cancer Sister    • Cancer Brother      History   Smoking Status   • Never Smoker   Smokeless Tobacco   • Never Used     No Known Allergies  Outpatient Encounter Prescriptions as of 2/14/2018   Medication Sig Dispense Refill   • oxybutynin (DITROPAN) 5 MG Tab      • thiamine (THIAMINE) 100 MG Tab Take 100 mg by mouth every day.     • amlodipine (NORVASC) 2.5 MG Tab Take 1 Tab by mouth every day. 90 Tab 2   • [DISCONTINUED] rivaroxaban (XARELTO) 15 MG Tab tablet Take 1 Tab by mouth with dinner. 90 Tab 3   • triamterene-hctz (MAXZIDE-25/DYAZIDE) 37.5-25 MG Tab Take 1 Tab by mouth every morning. 90 Tab 3   • lisinopril (PRINIVIL) 20 MG Tab Take 1 Tab by mouth every day. 90 Tab 3   • vitamin D (CHOLECALCIFEROL) 1000 UNIT Tab Take 1,000 Units by mouth every day.     • doxazosin (CARDURA) 4 MG Tab TAKE ONE TABLET BY MOUTH TWICE DAILY 180 Tab 1   • atorvastatin (LIPITOR) 10 MG Tab Take 1 Tab by mouth every day. 30 Tab 11   • [DISCONTINUED] potassium chloride SA (K-DUR) 10 MEQ TBCR Take 10 mEq by mouth every day.     • [DISCONTINUED] EQ ASPIRIN ADULT LOW DOSE 81 MG EC tablet TAKE ONE TABLET BY MOUTH ONCE DAILY 90 Tab 3     No facility-administered encounter medications on file as of 2/14/2018.      Review of Systems   Constitutional: Negative for chills and fever.   Respiratory: Positive for shortness of breath. Negative for cough.    Cardiovascular: Negative for chest pain, palpitations, orthopnea, leg swelling and PND.   Gastrointestinal: Negative for abdominal pain and nausea.   Musculoskeletal: Negative for myalgias.   Neurological: Positive for dizziness. Negative for loss of consciousness and headaches.   Endo/Heme/Allergies: Does not  "bruise/bleed easily.        Objective:   /50   Pulse 82   Ht 1.905 m (6' 3\")   Wt 108.9 kg (240 lb)   SpO2 96%   BMI 30.00 kg/m²      Physical Exam   Constitutional: He is oriented to person, place, and time. He appears well-developed and well-nourished. No distress.   HENT:   Head: Normocephalic.   Eyes: Conjunctivae and EOM are normal.   Neck: Normal range of motion. Neck supple. No JVD present.   Cardiovascular: Normal rate, normal heart sounds and intact distal pulses.  An irregularly irregular rhythm present. Exam reveals no gallop and no friction rub.    No murmur heard.  Pulmonary/Chest: Effort normal and breath sounds normal.   Abdominal: Soft. Bowel sounds are normal.   Musculoskeletal: Normal range of motion. He exhibits no edema.   Neurological: He is alert and oriented to person, place, and time.   Skin: Skin is warm and dry. No rash noted. There is erythema.   Skin changes of chronic venous insufficiency.   Psychiatric: He has a normal mood and affect. His behavior is normal.     EKG today reveals atrial fibrillation at a rate of  bpm.    Assessment:     1. Hematuria, gross     2. Tachy-boyd syndrome (CMS-HCC)  HOLTER MONITOR / EVENT RECORDER   3. Chronic atrial fibrillation (CMS-HCC)  EKG    HOLTER MONITOR / EVENT RECORDER   4. Chronic anticoagulation     5. Abnormal cardiac function test     6. Mixed hyperlipidemia     7. BPH (benign prostatic hypertrophy) with urinary retention     8. Stage 2 chronic kidney disease  BASIC METABOLIC PANEL       Medical Decision Making:  Today's Assessment / Status / Plan:     1. Hematuria, now resolved.  Could try switching from Xarelto to Eliquis, and see if this helps.  Samples are given today.    2. Recent tachy-boyd syndrome with history of chronic atrial fibrillation. To obtain ZioPatch, to see what HR is doing over time, given symptoms of dizziness/lightheadedness.    3. Chronic anticoagulation, as above, to switch from Xarelto to low dose " Eliquis.    4. History of abnormal MPI, with fixed inferior wall defect and LVEF 44%.  He does have some dyspnea on exertion as well; consider workup at FU with Dr. Dai.    5. Hyperlipidemia, treated.    6. BPH, followed by urology. He may be needing repeat TURP soon.    7. Chronic kidney disease, followed by Miguel Grossman. Needs BMP done; he does have various duplicate medications, and needs to assess potassium status and renal function. To be done tomorrow.    8. Hypertension, treated. Again, he does have duplicates listed for BP medications; I will have my nurse call him at home today or tomorrow to reconcile medications.    Keep FU on 3/15/2018 with Dr. Dai. Plan as above: Labs, ZioPatch and switch Xarelto to Eliquis.    Collaborating MD: Zaki Yee

## 2018-02-14 NOTE — PROGRESS NOTES
Pt presents to the [place] office of cardiology for samples.  Pt c/o bleeding on Xarelto 20mg daily for atrial fibrillation   Last Scr 1.6  Verified no anticonvulsants, azoles  Eliquis 28 days given.  Drug ordered at local drug store  Lady Harrell PharmD    Duplicate medications of lisinopril, HCTZ, etz  Pt will get labs drawn in am for CBC, CMP, LIPID panel

## 2018-02-15 ENCOUNTER — NON-PROVIDER VISIT (OUTPATIENT)
Dept: CARDIOLOGY | Facility: CLINIC | Age: 78
End: 2018-02-15
Payer: MEDICARE

## 2018-02-15 ENCOUNTER — TELEPHONE (OUTPATIENT)
Dept: CARDIOLOGY | Facility: MEDICAL CENTER | Age: 78
End: 2018-02-15

## 2018-02-15 ENCOUNTER — OFFICE VISIT (OUTPATIENT)
Dept: CARDIOLOGY | Facility: CLINIC | Age: 78
End: 2018-02-15
Payer: MEDICARE

## 2018-02-15 VITALS
BODY MASS INDEX: 30.09 KG/M2 | WEIGHT: 242 LBS | HEIGHT: 75 IN | DIASTOLIC BLOOD PRESSURE: 40 MMHG | SYSTOLIC BLOOD PRESSURE: 70 MMHG | OXYGEN SATURATION: 96 % | HEART RATE: 71 BPM

## 2018-02-15 DIAGNOSIS — I48.91 ATRIAL FIBRILLATION, UNSPECIFIED TYPE (HCC): ICD-10-CM

## 2018-02-15 DIAGNOSIS — I10 ESSENTIAL HYPERTENSION, BENIGN: ICD-10-CM

## 2018-02-15 DIAGNOSIS — I47.29 NSVT (NONSUSTAINED VENTRICULAR TACHYCARDIA) (HCC): ICD-10-CM

## 2018-02-15 DIAGNOSIS — I48.20 CHRONIC ATRIAL FIBRILLATION (HCC): ICD-10-CM

## 2018-02-15 LAB — EKG IMPRESSION: NORMAL

## 2018-02-15 PROCEDURE — 93000 ELECTROCARDIOGRAM COMPLETE: CPT | Performed by: INTERNAL MEDICINE

## 2018-02-15 RX ORDER — AMLODIPINE BESYLATE 2.5 MG/1
2.5 TABLET ORAL PRN
Qty: 1 TAB | Refills: 1 | COMMUNITY
Start: 2018-02-15 | End: 2023-08-31

## 2018-02-15 NOTE — TELEPHONE ENCOUNTER
It was a Zio- I forgot this was put in on the  schedule.  BP was 70 systolic at the lab so he went to ER.

## 2018-02-15 NOTE — TELEPHONE ENCOUNTER
If he wants to be seen between now and 3/15, you can put him on my CC schedule - mostly to check BP, and whether he's having any symptoms.    Thanks, AB

## 2018-02-15 NOTE — TELEPHONE ENCOUNTER
Really?  I know he's getting a ZioPatch done in [place], but don't see an appt with AM?  Labs are stable - he does have FU with another nephrologist soon, and should keep this.  Would start Lipitor 10mg once daily. Repeat CMP and lipid panel in 1 month.  Thanks, AB

## 2018-02-15 NOTE — PROGRESS NOTES
Patient came into office today for diabetic patch placement.  Hypotensive symptomatic complaining of dizziness  EKG showed atrial fibrillation  Patient will be sent to ER for further evaluation and management.

## 2018-02-15 NOTE — TELEPHONE ENCOUNTER
Just saw the notes.  Would suggest the followin. Add MagOx 400mg once daily.    2. HOLD Amlodipine.  IF BP>140/90, he can take Amlodipine.    3. Make sure he gets ZioPatch - NEEDS to have long term monitoring.    4. Keep FU with Nephrology, and keep FU with CW on 3/15.    IF he needs to be seen before 3/15, I am always happy to see him.    THANKS!

## 2018-02-15 NOTE — TELEPHONE ENCOUNTER
I spoke with Lady.  She took down the information and will comply.  He had his Zio put on by [place] today!  Appt with nephrology is 4/20 Tuesday).

## 2018-02-15 NOTE — TELEPHONE ENCOUNTER
----- Message from SHIVA Tompkins sent at 2/15/2018 10:21 AM PST -----  Regarding: RE: Can you pls call and reconcile meds?  Thank you for cleaning up med list!    Yes, let's do labs first, and then we can determine whether he needs to start Lipitor or not.    THANKS!  ----- Message -----  From: Marcelle Cohen R.N.  Sent: 2018   5:06 PM  To: SHIVA Tompkins  Subject: RE: Can you pls call and reconcile meds?         Hi!  Med list is updated.  2 items to note:  1- Patient has not been taking Atorvastatin 10 mg (looks like he never called in for a new Rx and it )    2- He has been taking Lisinopril 20/12.5 daily- and the record had him taking Lisinopril 20 daily (ordered by PCP in Sept or Oct)    3- Doesn't look like he has had a CMP or Lipid done in some time- so I asked him to take Dr. Penn's orders and Kidney doctor's orders tomorrow to do all at once.  Hope that's ok with you.      ----- Message -----  From: SHIVA Tompkins  Sent: 2018  12:48 PM  To: Marcelle Cohen R.N.  Subject: Can you pls call and reconcile meds?             I saw this pt today in  - his med list has lots of duplicates on it.    Would you please call him at home and reconcile his list, as to what he is actually taking?    He also NEEDS to get labs done tomorrow in [place] - orders are in the system.    We switched him from Xarelto to Eliquis, and I also ordered a ZioPatch.    THANK YOU!

## 2018-03-06 ENCOUNTER — TELEPHONE (OUTPATIENT)
Dept: CARDIOLOGY | Facility: MEDICAL CENTER | Age: 78
End: 2018-03-06

## 2018-03-06 DIAGNOSIS — I48.20 CHRONIC ATRIAL FIBRILLATION (HCC): ICD-10-CM

## 2018-03-07 ENCOUNTER — TELEPHONE (OUTPATIENT)
Dept: CARDIOLOGY | Facility: MEDICAL CENTER | Age: 78
End: 2018-03-07

## 2018-03-07 NOTE — TELEPHONE ENCOUNTER
Message   Received: Yesterday   Message Contents   Damaso Dai M.D.  Cassy Noble R.N.; Alexa Curry, Med Ass't             He needs a pacemaker based on his Zio patch, I called him schedule at his convienince [facility] on Eliquis     Thank you

## 2018-03-07 NOTE — TELEPHONE ENCOUNTER
Called patient with his Zio patch results he had both tachycardia and pauses noted up to 4.8 seconds, he is not on rate control medication and so likely benefit from a permanent pacemaker in order to tolerate rate control, we couldalso explore rhythm control.    Is in agreement to do a pacemaker apparently he had prior evaluation is recommended by cardiology and [place] but ultimately felt he could continue to monitor.    I will see Mr. Bianchi back in 2 weeks time and encouraged him to follow up with us over the phone or e-mail using my MyChart as issues arise.    It is my pleasure to participate in the care of Mr. Bianchi.  Please do not hesitate to contact me with questions or concerns.    Damaso Dai MD PhD [facility]  Cardiologist [facility]

## 2018-03-07 NOTE — TELEPHONE ENCOUNTER
Patient scheduled for PM insert on 3-13-18 at [facility] with Dr. Simpson.

## 2018-03-13 ENCOUNTER — HOSPITAL ENCOUNTER (INPATIENT)
Facility: MEDICAL CENTER | Age: 78
LOS: 1 days | DRG: 244 | End: 2018-03-15
Attending: INTERNAL MEDICINE | Admitting: INTERNAL MEDICINE
Payer: MEDICARE

## 2018-03-13 ENCOUNTER — APPOINTMENT (OUTPATIENT)
Dept: RADIOLOGY | Facility: MEDICAL CENTER | Age: 78
DRG: 244 | End: 2018-03-13
Attending: INTERNAL MEDICINE
Payer: MEDICARE

## 2018-03-13 LAB
ALBUMIN SERPL BCP-MCNC: 3.8 G/DL (ref 3.2–4.9)
ALBUMIN SERPL BCP-MCNC: 4.2 G/DL (ref 3.2–4.9)
ALBUMIN/GLOB SERPL: 1.4 G/DL
ALBUMIN/GLOB SERPL: 1.6 G/DL
ALP SERPL-CCNC: 59 U/L (ref 30–99)
ALP SERPL-CCNC: 72 U/L (ref 30–99)
ALT SERPL-CCNC: 17 U/L (ref 2–50)
ALT SERPL-CCNC: 27 U/L (ref 2–50)
ANION GAP SERPL CALC-SCNC: 10 MMOL/L (ref 0–11.9)
ANION GAP SERPL CALC-SCNC: 5 MMOL/L (ref 0–11.9)
ANION GAP SERPL CALC-SCNC: 7 MMOL/L (ref 0–11.9)
AST SERPL-CCNC: 15 U/L (ref 12–45)
AST SERPL-CCNC: 19 U/L (ref 12–45)
BILIRUB SERPL-MCNC: 0.9 MG/DL (ref 0.1–1.5)
BILIRUB SERPL-MCNC: 1.1 MG/DL (ref 0.1–1.5)
BUN SERPL-MCNC: 15 MG/DL (ref 8–22)
BUN SERPL-MCNC: 19 MG/DL (ref 8–22)
BUN SERPL-MCNC: 21 MG/DL (ref 8–22)
CALCIUM SERPL-MCNC: 8.4 MG/DL (ref 8.5–10.5)
CALCIUM SERPL-MCNC: 9.4 MG/DL (ref 8.5–10.5)
CALCIUM SERPL-MCNC: 9.9 MG/DL (ref 8.5–10.5)
CHLORIDE SERPL-SCNC: 105 MMOL/L (ref 96–112)
CHLORIDE SERPL-SCNC: 108 MMOL/L (ref 96–112)
CHLORIDE SERPL-SCNC: 109 MMOL/L (ref 96–112)
CO2 SERPL-SCNC: 22 MMOL/L (ref 20–33)
CO2 SERPL-SCNC: 24 MMOL/L (ref 20–33)
CO2 SERPL-SCNC: 26 MMOL/L (ref 20–33)
CREAT SERPL-MCNC: 0.43 MG/DL (ref 0.5–1.4)
CREAT SERPL-MCNC: 1.15 MG/DL (ref 0.5–1.4)
CREAT SERPL-MCNC: 1.29 MG/DL (ref 0.5–1.4)
ERYTHROCYTE [DISTWIDTH] IN BLOOD BY AUTOMATED COUNT: 42.4 FL (ref 35.9–50)
GLOBULIN SER CALC-MCNC: 2.4 G/DL (ref 1.9–3.5)
GLOBULIN SER CALC-MCNC: 2.9 G/DL (ref 1.9–3.5)
GLUCOSE SERPL-MCNC: 93 MG/DL (ref 65–99)
GLUCOSE SERPL-MCNC: 95 MG/DL (ref 65–99)
GLUCOSE SERPL-MCNC: 97 MG/DL (ref 65–99)
HCT VFR BLD AUTO: 46.1 % (ref 42–52)
HGB BLD-MCNC: 15.8 G/DL (ref 14–18)
INR PPP: 1.25 (ref 0.87–1.13)
MAGNESIUM SERPL-MCNC: 1.8 MG/DL (ref 1.5–2.5)
MAGNESIUM SERPL-MCNC: 2.4 MG/DL (ref 1.5–2.5)
MCH RBC QN AUTO: 30.1 PG (ref 27–33)
MCHC RBC AUTO-ENTMCNC: 34.3 G/DL (ref 33.7–35.3)
MCV RBC AUTO: 87.8 FL (ref 81.4–97.8)
PLATELET # BLD AUTO: 180 K/UL (ref 164–446)
PMV BLD AUTO: 9.4 FL (ref 9–12.9)
POTASSIUM SERPL-SCNC: 3.4 MMOL/L (ref 3.6–5.5)
POTASSIUM SERPL-SCNC: 3.5 MMOL/L (ref 3.6–5.5)
POTASSIUM SERPL-SCNC: 3.7 MMOL/L (ref 3.6–5.5)
PROT SERPL-MCNC: 6.2 G/DL (ref 6–8.2)
PROT SERPL-MCNC: 7.1 G/DL (ref 6–8.2)
PROTHROMBIN TIME: 15.4 SEC (ref 12–14.6)
RBC # BLD AUTO: 5.25 M/UL (ref 4.7–6.1)
SODIUM SERPL-SCNC: 138 MMOL/L (ref 135–145)
SODIUM SERPL-SCNC: 138 MMOL/L (ref 135–145)
SODIUM SERPL-SCNC: 140 MMOL/L (ref 135–145)
WBC # BLD AUTO: 8.1 K/UL (ref 4.8–10.8)

## 2018-03-13 PROCEDURE — 93005 ELECTROCARDIOGRAM TRACING: CPT | Performed by: INTERNAL MEDICINE

## 2018-03-13 PROCEDURE — G0378 HOSPITAL OBSERVATION PER HR: HCPCS

## 2018-03-13 PROCEDURE — 83735 ASSAY OF MAGNESIUM: CPT

## 2018-03-13 PROCEDURE — 93005 ELECTROCARDIOGRAM TRACING: CPT | Performed by: NURSE PRACTITIONER

## 2018-03-13 PROCEDURE — 700102 HCHG RX REV CODE 250 W/ 637 OVERRIDE(OP): Performed by: INTERNAL MEDICINE

## 2018-03-13 PROCEDURE — 80053 COMPREHEN METABOLIC PANEL: CPT

## 2018-03-13 PROCEDURE — 36415 COLL VENOUS BLD VENIPUNCTURE: CPT

## 2018-03-13 PROCEDURE — 85610 PROTHROMBIN TIME: CPT

## 2018-03-13 PROCEDURE — 80048 BASIC METABOLIC PNL TOTAL CA: CPT

## 2018-03-13 PROCEDURE — 02HK3JZ INSERTION OF PACEMAKER LEAD INTO RIGHT VENTRICLE, PERCUTANEOUS APPROACH: ICD-10-PCS | Performed by: INTERNAL MEDICINE

## 2018-03-13 PROCEDURE — 99152 MOD SED SAME PHYS/QHP 5/>YRS: CPT

## 2018-03-13 PROCEDURE — 700111 HCHG RX REV CODE 636 W/ 250 OVERRIDE (IP)

## 2018-03-13 PROCEDURE — 160002 HCHG RECOVERY MINUTES (STAT)

## 2018-03-13 PROCEDURE — 700111 HCHG RX REV CODE 636 W/ 250 OVERRIDE (IP): Performed by: INTERNAL MEDICINE

## 2018-03-13 PROCEDURE — 02H63JZ INSERTION OF PACEMAKER LEAD INTO RIGHT ATRIUM, PERCUTANEOUS APPROACH: ICD-10-PCS | Performed by: INTERNAL MEDICINE

## 2018-03-13 PROCEDURE — A9270 NON-COVERED ITEM OR SERVICE: HCPCS | Performed by: INTERNAL MEDICINE

## 2018-03-13 PROCEDURE — C1785 PMKR, DUAL, RATE-RESP: HCPCS

## 2018-03-13 PROCEDURE — 93010 ELECTROCARDIOGRAM REPORT: CPT | Mod: 76 | Performed by: INTERNAL MEDICINE

## 2018-03-13 PROCEDURE — 99153 MOD SED SAME PHYS/QHP EA: CPT

## 2018-03-13 PROCEDURE — 700102 HCHG RX REV CODE 250 W/ 637 OVERRIDE(OP): Performed by: NURSE PRACTITIONER

## 2018-03-13 PROCEDURE — C1894 INTRO/SHEATH, NON-LASER: HCPCS

## 2018-03-13 PROCEDURE — 33208 INSRT HEART PM ATRIAL & VENT: CPT

## 2018-03-13 PROCEDURE — 71045 X-RAY EXAM CHEST 1 VIEW: CPT

## 2018-03-13 PROCEDURE — 700111 HCHG RX REV CODE 636 W/ 250 OVERRIDE (IP): Performed by: NURSE PRACTITIONER

## 2018-03-13 PROCEDURE — 700101 HCHG RX REV CODE 250

## 2018-03-13 PROCEDURE — 304853 HCHG PPM TEST CABLE

## 2018-03-13 PROCEDURE — 0JH606Z INSERTION OF PACEMAKER, DUAL CHAMBER INTO CHEST SUBCUTANEOUS TISSUE AND FASCIA, OPEN APPROACH: ICD-10-PCS | Performed by: INTERNAL MEDICINE

## 2018-03-13 PROCEDURE — C1892 INTRO/SHEATH,FIXED,PEEL-AWAY: HCPCS

## 2018-03-13 PROCEDURE — 305387 HCHG SUTURES

## 2018-03-13 PROCEDURE — A9270 NON-COVERED ITEM OR SERVICE: HCPCS | Performed by: NURSE PRACTITIONER

## 2018-03-13 PROCEDURE — 85027 COMPLETE CBC AUTOMATED: CPT

## 2018-03-13 PROCEDURE — C1898 LEAD, PMKR, OTHER THAN TRANS: HCPCS

## 2018-03-13 PROCEDURE — 304952 HCHG R 2 PADS

## 2018-03-13 RX ORDER — LISINOPRIL 20 MG/1
20 TABLET ORAL
Status: DISCONTINUED | OUTPATIENT
Start: 2018-03-13 | End: 2018-03-15 | Stop reason: HOSPADM

## 2018-03-13 RX ORDER — THIAMINE MONONITRATE (VIT B1) 100 MG
100 TABLET ORAL DAILY
Status: DISCONTINUED | OUTPATIENT
Start: 2018-03-14 | End: 2018-03-15 | Stop reason: HOSPADM

## 2018-03-13 RX ORDER — CEFAZOLIN SODIUM 2 G/100ML
2 INJECTION, SOLUTION INTRAVENOUS EVERY 8 HOURS
Status: DISCONTINUED | OUTPATIENT
Start: 2018-03-13 | End: 2018-03-13

## 2018-03-13 RX ORDER — CEFAZOLIN SODIUM 1 G/3ML
INJECTION, POWDER, FOR SOLUTION INTRAMUSCULAR; INTRAVENOUS
Status: COMPLETED
Start: 2018-03-13 | End: 2018-03-13

## 2018-03-13 RX ORDER — FINASTERIDE 5 MG/1
5 TABLET, FILM COATED ORAL DAILY
Status: DISCONTINUED | OUTPATIENT
Start: 2018-03-13 | End: 2018-03-15 | Stop reason: HOSPADM

## 2018-03-13 RX ORDER — POTASSIUM CHLORIDE 20 MEQ/1
10 TABLET, EXTENDED RELEASE ORAL DAILY
Status: DISCONTINUED | OUTPATIENT
Start: 2018-03-14 | End: 2018-03-15

## 2018-03-13 RX ORDER — LIDOCAINE HYDROCHLORIDE 20 MG/ML
INJECTION, SOLUTION INFILTRATION; PERINEURAL
Status: COMPLETED
Start: 2018-03-13 | End: 2018-03-13

## 2018-03-13 RX ORDER — DOXAZOSIN 2 MG/1
4 TABLET ORAL 2 TIMES DAILY
Status: DISCONTINUED | OUTPATIENT
Start: 2018-03-13 | End: 2018-03-15 | Stop reason: HOSPADM

## 2018-03-13 RX ORDER — AMLODIPINE BESYLATE 5 MG/1
2.5 TABLET ORAL
Status: DISCONTINUED | OUTPATIENT
Start: 2018-03-14 | End: 2018-03-15 | Stop reason: HOSPADM

## 2018-03-13 RX ORDER — CEFAZOLIN SODIUM 2 G/100ML
2 INJECTION, SOLUTION INTRAVENOUS
Status: COMPLETED | OUTPATIENT
Start: 2018-03-13 | End: 2018-03-13

## 2018-03-13 RX ORDER — BUPIVACAINE HYDROCHLORIDE 2.5 MG/ML
INJECTION, SOLUTION EPIDURAL; INFILTRATION; INTRACAUDAL
Status: COMPLETED
Start: 2018-03-13 | End: 2018-03-13

## 2018-03-13 RX ORDER — POTASSIUM CHLORIDE 20 MEQ/1
40 TABLET, EXTENDED RELEASE ORAL ONCE
Status: COMPLETED | OUTPATIENT
Start: 2018-03-13 | End: 2018-03-13

## 2018-03-13 RX ORDER — DOFETILIDE 0.5 MG/1
500 CAPSULE ORAL EVERY 12 HOURS
Status: DISCONTINUED | OUTPATIENT
Start: 2018-03-13 | End: 2018-03-15 | Stop reason: HOSPADM

## 2018-03-13 RX ORDER — MIDAZOLAM HYDROCHLORIDE 1 MG/ML
INJECTION INTRAMUSCULAR; INTRAVENOUS
Status: COMPLETED
Start: 2018-03-13 | End: 2018-03-13

## 2018-03-13 RX ORDER — TRIAMTERENE CAPSULES 50 MG/1
50 CAPSULE ORAL
Status: DISCONTINUED | OUTPATIENT
Start: 2018-03-13 | End: 2018-03-14

## 2018-03-13 RX ORDER — POTASSIUM CHLORIDE 750 MG/1
10 TABLET, FILM COATED, EXTENDED RELEASE ORAL DAILY
Status: DISCONTINUED | OUTPATIENT
Start: 2018-03-14 | End: 2018-03-13

## 2018-03-13 RX ORDER — MAGNESIUM SULFATE HEPTAHYDRATE 40 MG/ML
2 INJECTION, SOLUTION INTRAVENOUS ONCE
Status: COMPLETED | OUTPATIENT
Start: 2018-03-13 | End: 2018-03-13

## 2018-03-13 RX ORDER — MAGNESIUM OXIDE 400 MG/1
400 TABLET ORAL DAILY
COMMUNITY
End: 2019-11-11

## 2018-03-13 RX ADMIN — DIBASIC SODIUM PHOSPHATE, MONOBASIC POTASSIUM PHOSPHATE AND MONOBASIC SODIUM PHOSPHATE 1 TABLET: 852; 155; 130 TABLET ORAL at 20:48

## 2018-03-13 RX ADMIN — MAGNESIUM SULFATE IN WATER 2 G: 40 INJECTION, SOLUTION INTRAVENOUS at 11:45

## 2018-03-13 RX ADMIN — POTASSIUM CHLORIDE 40 MEQ: 1500 TABLET, EXTENDED RELEASE ORAL at 12:08

## 2018-03-13 RX ADMIN — MAGNESIUM GLUCONATE 500 MG ORAL TABLET 400 MG: 500 TABLET ORAL at 12:18

## 2018-03-13 RX ADMIN — DOFETILIDE 500 MCG: 0.5 CAPSULE ORAL at 20:48

## 2018-03-13 RX ADMIN — LISINOPRIL 20 MG: 20 TABLET ORAL at 12:18

## 2018-03-13 RX ADMIN — DOXAZOSIN 4 MG: 2 TABLET ORAL at 20:49

## 2018-03-13 RX ADMIN — FINASTERIDE 5 MG: 5 TABLET, FILM COATED ORAL at 12:17

## 2018-03-13 RX ADMIN — DOFETILIDE 500 MCG: 0.5 CAPSULE ORAL at 10:40

## 2018-03-13 RX ADMIN — TRIAMTERENE 50 MG: 50 CAPSULE ORAL at 12:18

## 2018-03-13 RX ADMIN — MIDAZOLAM 2 MG: 1 INJECTION INTRAMUSCULAR; INTRAVENOUS at 08:05

## 2018-03-13 RX ADMIN — LIDOCAINE HYDROCHLORIDE: 20 INJECTION, SOLUTION INFILTRATION; PERINEURAL at 08:04

## 2018-03-13 RX ADMIN — CEFAZOLIN 3000 MG: 330 INJECTION, POWDER, FOR SOLUTION INTRAMUSCULAR; INTRAVENOUS at 08:05

## 2018-03-13 RX ADMIN — APIXABAN 5 MG: 5 TABLET, FILM COATED ORAL at 20:49

## 2018-03-13 RX ADMIN — APIXABAN 5 MG: 5 TABLET, FILM COATED ORAL at 12:16

## 2018-03-13 RX ADMIN — CEFAZOLIN SODIUM 2 G: 2 INJECTION, SOLUTION INTRAVENOUS at 16:38

## 2018-03-13 RX ADMIN — FENTANYL CITRATE 100 MCG: 50 INJECTION, SOLUTION INTRAMUSCULAR; INTRAVENOUS at 08:11

## 2018-03-13 RX ADMIN — BUPIVACAINE HYDROCHLORIDE: 2.5 INJECTION, SOLUTION EPIDURAL; INFILTRATION; INTRACAUDAL; PERINEURAL at 08:04

## 2018-03-13 RX ADMIN — MIDAZOLAM 2 MG: 1 INJECTION INTRAMUSCULAR; INTRAVENOUS at 08:11

## 2018-03-13 ASSESSMENT — PAIN SCALES - GENERAL
PAINLEVEL_OUTOF10: 0
PAINLEVEL_OUTOF10: 3
PAINLEVEL_OUTOF10: 0

## 2018-03-13 ASSESSMENT — COGNITIVE AND FUNCTIONAL STATUS - GENERAL
SUGGESTED CMS G CODE MODIFIER DAILY ACTIVITY: CH
SUGGESTED CMS G CODE MODIFIER MOBILITY: CH
MOBILITY SCORE: 24
DAILY ACTIVITIY SCORE: 24

## 2018-03-13 ASSESSMENT — LIFESTYLE VARIABLES
EVER_SMOKED: NEVER
ALCOHOL_USE: NO

## 2018-03-13 ASSESSMENT — ENCOUNTER SYMPTOMS: DIZZINESS: 1

## 2018-03-13 NOTE — PROGRESS NOTES
Called report to tele RN. All questions and concerns answered. Pt. Attached to monitor and transported to unit with belongings, chart and family

## 2018-03-13 NOTE — PROGRESS NOTES
2 RN skin assessment completed with WENDY Horton. Patient has left subclavian pacemaker. Pressure dressing CDI. No other signs of skin breakdown.

## 2018-03-13 NOTE — PROCEDURES
PROCEDURE PERFORMED: dual chamber pacemaker, moderate sedation administered by RN and supervised by physician    : Xavi Simpson MD    ASSISTANT: none    ANESTHESIA: Moderate sedation,  start time 0743, stop time 0821  the moderate sedation document has been reviewed and scanned into media     EBL: 30 cc    SPECIMENS: None    INDICATION: a fib with pauses    PRE PROCEDURE ECG: atial fibrillation with slow ventriuclar response    POST PROCEDURE ECG: ventricular paced rhythm     COMPLICATIONS:  None apparent     DESCRIPTION OF PROCEDURE:  After informed written consent, the patient was brought to the electrophysiology lab in the fasting, unsedated state. The patient was prepped and draped in the usual sterile fashion. The procedure was performed under moderate sedation with local anesthetic.   A left infraclavicular incision was made with a scalpel and the pectoral device pocket was created using a combination of blunt dissection and electrocautery. The modified Seldinger technique was used to gain access to the left axillary vein. A peel-away hemostasis sheath was placed in the vein. Under fluoroscopic guidance, the pacemaker leads were introduced into the heart. The ventricular lead was advanced to the RVOT and then lowered into position at the RV apex. The atrial lead was positioned in the Right atrial appendage. The leads were tested and had satisfactory sensing and pacing parameters. High output ventricular pacing did not produce extracardiac stimulation. The leads were sutured to the underlying pectoral muscle with interrupted non absorbable suture over a silastic suture sleeve. The device pocket was irrigated with antibiotic solution, inspected, and no bleeding was seen. The leads were connected to the dual chamber pulse generator and the device was inserted into the pocket The wound was closed with three layers of absorbable sutures.  I personally supervised the administration of moderate sedation by  Verito Rojas RN and observed the level of consciousness and physiologic status throughout the procedure.   Following recovery from sedation, the patient was transferred to a monitored bed in good condition.     IMPLANTED DEVICE INFORMATION:  Pulse generator is a [employer] model L311  Serial # [id number]    LEAD INFORMATION:  1)Right atrial lead is a [employer] model # 4470 , serial # [id number] ,P wave 0.8 millivolts in a fib, no  threshold, pacing impedance 394 Ohms.    2)Right ventricular lead is a [employer] model # 7732 , serial # [id number] ,R wave 12 millivolts, threshold 0.6 Volts, pacing impedance 836 Ohms.    DEVICE PROGRAMMING:  DDDR         IMPRESSIONS:  1. Successful dual chamber pacemaker implantation  2. Uneventful conscious sedation    RECOMMENDATIONS:  1. Transfer to monitored bed  2. PA and lateral chest x-ray  3. Device interrogation prior to hospital discharge  4. Followup in device clinic

## 2018-03-13 NOTE — PROGRESS NOTES
QTc post-first Tikosyn dose is hand calculated at 496 ms.  Ok to give tonight's dose at 21:00. Will recheck K & Mg prior to dosing.

## 2018-03-13 NOTE — PROGRESS NOTES
Report received and Pt admitted to PPU 4 s/p pacemaker placement. Site CDI with no s/s of bleeding or hematoma. Pt attached to all leads and monitors. VSS with no complaints of pain or nausea. Fluids and food offered. Orders reviewed.  Family at bedside.

## 2018-03-13 NOTE — H&P
Physician H&P    Patient ID:  Steven Bianchi  [id number]  77 y.o. male  1940    History:  Primary Diagnosis: a fib with pauses    HPI:  76 yo symptomatic a fib with pauses that cause some dizziness.  Pause over 5 seconds on zio.  Presenting for ppm.  Mildly reduced ef  Has been in af for years without attempt at sinus  Really feels bad in af    Has been on oac interupted almost 4 weeks ago with hematuria but over 3 weeks uninterupted now except delayed dose this am for ppm    Past Medical History:  has a past medical history of Abnormal cardiac function test (08/2017); Back pain; BPH (benign prostatic hyperplasia); Chronic anticoagulation; Chronic atrial fibrillation (CMS-HCC) (07/2017); Chronic kidney disease; Hematuria; Hyperlipidemia; Hypertension; and Knee pain.  Past Surgical History:  has a past surgical history that includes hemorrhoidectomy (1982) and prostatectomy, radial (2005).  Past Social History:  reports that he has never smoked. He has never used smokeless tobacco. He reports that he drinks about 1.0 oz of alcohol per week . He reports that he does not use drugs.  Past Family History:   Family History   Problem Relation Age of Onset   • Cancer Mother    • Heart Disease Father    • Cancer Sister    • Cancer Brother      Allergies: Patient has no known allergies.    Current Medications:  Prior to Admission medications    Medication Sig Start Date End Date Taking? Authorizing Provider   magnesium oxide (MAG-OX) 400 MG Tab Take 400 mg by mouth every day.   Yes Physician Outpatient   phosphorus (K-PHOS-NEUTRAL, PHOSPHA 250 NEUTRAL) 155-852-130 MG tablet Take 1 Tab by mouth 2 times a day.    Physician Outpatient   amLODIPine (NORVASC) 2.5 MG Tab Take 1 Tab by mouth as needed. For BP >140 systolic 2/15/18   Cielo Call, A.P.N.   thiamine (THIAMINE) 100 MG Tab Take 100 mg by mouth every day.    Physician Outpatient   apixaban (ELIQUIS) 5mg Tab Take 1 Tab by mouth 2 Times a Day. 2/14/18   Cielo Call, A.P.N.  "  finasteride (PROSCAR) 5 MG Tab Take 1 Tab by mouth every day. 2/14/18   Cielo Call, A.P.N.   potassium chloride ER (KLOR-CON) 10 MEQ tablet Take 1 Tab by mouth every day. 2/14/18   Cielo Call, A.P.N.   lisinopril-hydrochlorothiazide (PRINZIDE, ZESTORETIC) 20-12.5 MG per tablet Take 1 Tab by mouth every day. 2/14/18   Cielo Call, A.P.N.   Cholecalciferol (VITAMIN D-3) 1000 units Cap Take 1 Tab by mouth every day. 2/14/18   Cielo Call, A.P.N.   doxazosin (CARDURA) 4 MG Tab Take 1 Tab by mouth 2 Times a Day. 2/14/18   Cielo Call, A.P.N.   triamterene-hctz (MAXZIDE-25/DYAZIDE) 37.5-25 MG Tab Take 1 Tab by mouth every morning. 10/26/17   Josep Penn MD,[facility]       Review of Systems:  Review of Systems   Cardiovascular: Negative for chest pain.   Neurological: Positive for dizziness.   All other systems reviewed and are negative.    Blood pressure 138/98, pulse (!) 51, temperature 36.4 °C (97.5 °F), resp. rate 16, height 1.88 m (6' 2\"), weight 104.3 kg (230 lb), SpO2 95 %.    Physical Examination:  Physical Exam   Constitutional: He is oriented to person, place, and time. He appears well-developed and well-nourished. No distress.   HENT:   Head: Normocephalic and atraumatic.   Right Ear: External ear normal.   Left Ear: External ear normal.   Nose: Nose normal.   Mouth/Throat: Oropharynx is clear and moist.   Eyes: Conjunctivae and EOM are normal. Pupils are equal, round, and reactive to light. Right eye exhibits no discharge. Left eye exhibits no discharge. No scleral icterus.   Neck: Normal range of motion. Neck supple. No JVD present. No tracheal deviation present.   Cardiovascular: Normal rate, normal heart sounds and intact distal pulses.  An irregularly irregular rhythm present. Exam reveals no gallop and no friction rub.    No murmur heard.  Pulmonary/Chest: Effort normal and breath sounds normal. No stridor. No respiratory distress. He has no wheezes. He has no rales. He exhibits no tenderness.   Abdominal: Soft. " He exhibits no distension. There is no tenderness.   Musculoskeletal: He exhibits no edema or tenderness.   Neurological: He is alert and oriented to person, place, and time. No cranial nerve deficit. Coordination normal.   Skin: Skin is warm and dry. No rash noted. He is not diaphoretic. No erythema. No pallor.   Psychiatric: He has a normal mood and affect. His behavior is normal. Judgment and thought content normal.   Vitals reviewed.      Impression:  A fib- some pauses- will do dual to try to better control a fib and try to get sinus    Plan:  Ppm- dual.  Consider admission for tikosyn afterward  Pt wife does want tikosyn admission and pt agrees    Pre Procedure Assessment:  <EXAMSHORT2>      Pre Procedure update:   No changes.    Xavi Simpson  3/13/2018

## 2018-03-13 NOTE — PROGRESS NOTES
Pt seen at the bedside while in PPU.  Is s/p left sided dual chamber PPM placement by Dr. Simpson today.  Initiating tikosyn post-procedure.    K 3.4, repleted with 40 mEq PO x 1.  Mg 1.8, repleted with 2 g IV mag sulfate x 1.  Orders placed to recheck labs in the a.m.    Dose #1 of tikosyn given at 10:40 a.m this morning.    Baseline QTc intervals:  QTc pre-PPM - 375 ms (estimate; in AF)  QTc post-PPM - 487 ms (v-paced rhythm)    EKGs to be done 2 hours after each dose of tikosyn is given.    CrCl today was 212 mL/min, but the patient has CKD III so this will need to be monitored closely. Dose adjustment may be necessary.    Both the risks & benefits of tikosyn explained to the patient, including TdP.  All questions were answered.  He wishes to proceed at this time.    Discharge anticipated Thursday mid-day if uncomplicated.    Case discussed with Dr. Simpson.    The patient is currently awaiting bed assignment on the telemetry floor.  We will submit the tikosyn rx to  once we figure out what floor he will be transferred to.      We will continue to follow the patient closely.

## 2018-03-13 NOTE — PROGRESS NOTES
Patient admitted to -[id number] . Tele box on, patient assessed, vital signs stable. Oriented patient to room, skylight, and how to use call light. Call light within reach, bed alarm on, treaded socks on.  Left pacemaker site CDI

## 2018-03-14 ENCOUNTER — APPOINTMENT (OUTPATIENT)
Dept: RADIOLOGY | Facility: MEDICAL CENTER | Age: 78
DRG: 244 | End: 2018-03-14
Attending: INTERNAL MEDICINE
Payer: MEDICARE

## 2018-03-14 PROBLEM — Z79.01 CHRONIC ANTICOAGULATION: Chronic | Status: ACTIVE | Noted: 2018-02-14

## 2018-03-14 PROBLEM — I49.5 TACHY-BRADY SYNDROME (HCC): Chronic | Status: ACTIVE | Noted: 2018-02-14

## 2018-03-14 LAB
ANION GAP SERPL CALC-SCNC: 8 MMOL/L (ref 0–11.9)
BUN SERPL-MCNC: 18 MG/DL (ref 8–22)
CALCIUM SERPL-MCNC: 9.3 MG/DL (ref 8.5–10.5)
CHLORIDE SERPL-SCNC: 107 MMOL/L (ref 96–112)
CO2 SERPL-SCNC: 23 MMOL/L (ref 20–33)
CREAT SERPL-MCNC: 1.32 MG/DL (ref 0.5–1.4)
EKG IMPRESSION: NORMAL
GLUCOSE SERPL-MCNC: 95 MG/DL (ref 65–99)
MAGNESIUM SERPL-MCNC: 2.1 MG/DL (ref 1.5–2.5)
POTASSIUM SERPL-SCNC: 3.6 MMOL/L (ref 3.6–5.5)
SODIUM SERPL-SCNC: 138 MMOL/L (ref 135–145)

## 2018-03-14 PROCEDURE — 93010 ELECTROCARDIOGRAM REPORT: CPT | Mod: 77 | Performed by: INTERNAL MEDICINE

## 2018-03-14 PROCEDURE — 0296T PR EXT ECG > 48HR TO 21 DAY RCRD W/CONECT INTL RCRD: CPT | Performed by: INTERNAL MEDICINE

## 2018-03-14 PROCEDURE — 80048 BASIC METABOLIC PNL TOTAL CA: CPT

## 2018-03-14 PROCEDURE — A9270 NON-COVERED ITEM OR SERVICE: HCPCS | Performed by: INTERNAL MEDICINE

## 2018-03-14 PROCEDURE — 83735 ASSAY OF MAGNESIUM: CPT

## 2018-03-14 PROCEDURE — 93005 ELECTROCARDIOGRAM TRACING: CPT | Performed by: INTERNAL MEDICINE

## 2018-03-14 PROCEDURE — 93010 ELECTROCARDIOGRAM REPORT: CPT | Mod: 76 | Performed by: INTERNAL MEDICINE

## 2018-03-14 PROCEDURE — 770020 HCHG ROOM/CARE - TELE (206)

## 2018-03-14 PROCEDURE — 0298T PR EXT ECG > 48HR TO 21 DAY REVIEW AND INTERPRETATN: CPT | Performed by: INTERNAL MEDICINE

## 2018-03-14 PROCEDURE — A9270 NON-COVERED ITEM OR SERVICE: HCPCS | Performed by: NURSE PRACTITIONER

## 2018-03-14 PROCEDURE — 36415 COLL VENOUS BLD VENIPUNCTURE: CPT

## 2018-03-14 PROCEDURE — 700102 HCHG RX REV CODE 250 W/ 637 OVERRIDE(OP): Performed by: INTERNAL MEDICINE

## 2018-03-14 PROCEDURE — 71045 X-RAY EXAM CHEST 1 VIEW: CPT

## 2018-03-14 PROCEDURE — 700102 HCHG RX REV CODE 250 W/ 637 OVERRIDE(OP): Performed by: NURSE PRACTITIONER

## 2018-03-14 RX ORDER — SPIRONOLACTONE 25 MG/1
12.5 TABLET ORAL
Status: DISCONTINUED | OUTPATIENT
Start: 2018-03-14 | End: 2018-03-15 | Stop reason: HOSPADM

## 2018-03-14 RX ORDER — DOFETILIDE 0.5 MG/1
500 CAPSULE ORAL EVERY 12 HOURS
Qty: 60 CAP | Refills: 3 | Status: SHIPPED | OUTPATIENT
Start: 2018-03-14 | End: 2018-03-15

## 2018-03-14 RX ORDER — POTASSIUM CHLORIDE 20 MEQ/1
40 TABLET, EXTENDED RELEASE ORAL ONCE
Status: COMPLETED | OUTPATIENT
Start: 2018-03-14 | End: 2018-03-14

## 2018-03-14 RX ADMIN — DIBASIC SODIUM PHOSPHATE, MONOBASIC POTASSIUM PHOSPHATE AND MONOBASIC SODIUM PHOSPHATE 1 TABLET: 852; 155; 130 TABLET ORAL at 20:52

## 2018-03-14 RX ADMIN — FINASTERIDE 5 MG: 5 TABLET, FILM COATED ORAL at 09:05

## 2018-03-14 RX ADMIN — LISINOPRIL 20 MG: 20 TABLET ORAL at 09:06

## 2018-03-14 RX ADMIN — POTASSIUM CHLORIDE 10 MEQ: 1500 TABLET, EXTENDED RELEASE ORAL at 09:09

## 2018-03-14 RX ADMIN — DOXAZOSIN 4 MG: 2 TABLET ORAL at 09:07

## 2018-03-14 RX ADMIN — SPIRONOLACTONE 12.5 MG: 25 TABLET, FILM COATED ORAL at 16:54

## 2018-03-14 RX ADMIN — APIXABAN 5 MG: 5 TABLET, FILM COATED ORAL at 20:52

## 2018-03-14 RX ADMIN — VITAMIN D, TAB 1000IU (100/BT) 1000 UNITS: 25 TAB at 09:06

## 2018-03-14 RX ADMIN — POTASSIUM CHLORIDE 40 MEQ: 1500 TABLET, EXTENDED RELEASE ORAL at 09:07

## 2018-03-14 RX ADMIN — APIXABAN 5 MG: 5 TABLET, FILM COATED ORAL at 09:06

## 2018-03-14 RX ADMIN — MAGNESIUM GLUCONATE 500 MG ORAL TABLET 400 MG: 500 TABLET ORAL at 09:05

## 2018-03-14 RX ADMIN — DOFETILIDE 500 MCG: 0.5 CAPSULE ORAL at 09:05

## 2018-03-14 RX ADMIN — DOFETILIDE 500 MCG: 0.5 CAPSULE ORAL at 20:52

## 2018-03-14 RX ADMIN — Medication 100 MG: at 09:06

## 2018-03-14 RX ADMIN — DIBASIC SODIUM PHOSPHATE, MONOBASIC POTASSIUM PHOSPHATE AND MONOBASIC SODIUM PHOSPHATE 1 TABLET: 852; 155; 130 TABLET ORAL at 09:07

## 2018-03-14 RX ADMIN — DOXAZOSIN 4 MG: 2 TABLET ORAL at 20:52

## 2018-03-14 RX ADMIN — SPIRONOLACTONE 12.5 MG: 25 TABLET, FILM COATED ORAL at 11:27

## 2018-03-14 ASSESSMENT — ENCOUNTER SYMPTOMS
DIARRHEA: 0
SINUS PAIN: 0
FOCAL WEAKNESS: 0
DIAPHORESIS: 0
NERVOUS/ANXIOUS: 0
WHEEZING: 0
WEAKNESS: 0
PND: 0
SPEECH CHANGE: 0
NAUSEA: 0
ABDOMINAL PAIN: 0
CHILLS: 0
BLOOD IN STOOL: 0
HEADACHES: 0
SENSORY CHANGE: 0
ORTHOPNEA: 0
BRUISES/BLEEDS EASILY: 0
INSOMNIA: 0
SPUTUM PRODUCTION: 0
SHORTNESS OF BREATH: 0
COUGH: 0
SORE THROAT: 0
HEMOPTYSIS: 0
VOMITING: 0
PALPITATIONS: 0
DEPRESSION: 0
DIZZINESS: 0
CONSTIPATION: 0
FEVER: 0

## 2018-03-14 ASSESSMENT — PAIN SCALES - GENERAL
PAINLEVEL_OUTOF10: 0

## 2018-03-14 NOTE — PROGRESS NOTES
Report received from WENDY Vargas. Assumed care of patient. Updated patient on plan of care. Fall precautions in place, call light within reach.

## 2018-03-14 NOTE — PROGRESS NOTES
Assumed care at 1900, bedside report received from day shift RN. Pt. Is paced on the monitor. Initial assessment completed, orders reviewed, call light within reach, bed alarm in use, and hourly rounding in place. POC addressed with patient, no additional questions at this time.

## 2018-03-14 NOTE — PROGRESS NOTES
Cardiology Progress Note               Author: May Swanson Date & Time created: 3/14/2018  12:19 PM     Chief Complaint:  Atrial fibrillation with pauses & slow ventricular response    Interval History:  Patient seen on EPS rounds.    S/p dual chamber PPM placed by Dr. Simpson on 3/13/18.  Device site uncomplicated.  Pressure dressing removed.  No pneumothorax on a.m. CXR. Both leads appear appropriately positioned.  AF with intermittent v-pacing on tele, rates WNL.  VSS overnight.  Device interrogation done this morning revealed the following data that is stable from implant:  RA: 1.1 mV, 451 ohms  RV: 20.4 mV, 0.4 V, 902 ohms    K 3.6, Mg 2.1 this morning.    QTc intervals are as follows:  Baseline: Pre- ms, Post-PPM in v-paced rhythm 487 ms  After 1st dose: 496 ms  After 2nd dose: 487 ms (native beat)   **no EKG done for 2nd dose last night, was obtained just before 3rd dose this morning  After 3rd dose: 488 ms (native beat)    Pacemaker instructions/restrictions were reviewed with the patient & his wife at the bedside.  1.  No showers for one week; may take sponge bath.  Keep dressing dry & in place until seen at for you follow up visit at the cardiology office.  2.  No lifting over 10 lbs for six weeks.  3.  Do not raise affected arm above shoulder level or behind head for six weeks.  4.  Avoid excessive pushing, pulling, or twisting for six weeks.  5.  No driving for the first week.  6.  Call our office ([phone]) if you notice any increased swelling, redness, warmth, or drainage at the implant site.  7.  Call our office if you develop fever > 101F or uncontrolled pain.  8.  No MRI's for 6 weeks if you have a MRI compatible device.  9.  No dental work or cleanings for 3 months.  10.  May remove arm sling after one day, but please wear if you have trouble remembering to keep your arm down.  11. Do not place cell phones or mobile devices directly over implanted device.   12. Your follow-up  appointments will be done at approximately 1 week, 6 weeks, then every 3-4 months.    The risks, benefits, and alternatives to electrical cardioversion were discussed in great detail. We discussed that conversion of atrial fibrillation to normal rhythm, at least transiently, is successful in 90 to 95% of patients. However, maintaining a normal rhythm depends on a number of factors, including underlying heart disease and antiarrhythmic medications. Atrial fibrillation often recurs with time and other treatments may be necessary. Risks of  cardioversion are low as long as anticoagulation issues are handled appropriately. There is a small (less than 1%) risk of embolic events, including stroke. Risks of electrical shock include mild muscle soreness and mild skin burning at the site of electrode placement. There is also a risk that cardioversion can stimulate more dangerous arrhythmias. The patient verbalized understanding of these potential complications and wishes to proceed with this procedure.    Review of Systems   Constitutional: Negative for chills, diaphoresis, fever and malaise/fatigue.   HENT: Negative for congestion, nosebleeds, sinus pain and sore throat.    Respiratory: Negative for cough, hemoptysis, sputum production, shortness of breath and wheezing.    Cardiovascular: Negative for chest pain, palpitations, orthopnea, leg swelling and PND.   Gastrointestinal: Negative for abdominal pain, blood in stool, constipation, diarrhea, melena, nausea and vomiting.   Genitourinary: Negative for dysuria, frequency, hematuria and urgency.        Chronic, intermittent frequency secondary to severe BPH (needs TURP)   Skin: Negative for itching and rash.   Neurological: Negative for dizziness, sensory change, speech change, focal weakness, weakness and headaches.   Endo/Heme/Allergies: Does not bruise/bleed easily.   Psychiatric/Behavioral: Negative for depression. The patient is not nervous/anxious and does not have  insomnia.    All other systems reviewed and are negative.    Physical Exam   Constitutional: He is oriented to person, place, and time. He appears well-developed and well-nourished. No distress.   HENT:   Head: Normocephalic and atraumatic.   Eyes: EOM are normal. Pupils are equal, round, and reactive to light. Right eye exhibits no discharge. Left eye exhibits no discharge. No scleral icterus.   Neck: Normal range of motion. Neck supple. No JVD present.   Cardiovascular: Normal rate, normal heart sounds and intact distal pulses.  An irregularly irregular rhythm present. Exam reveals no gallop and no friction rub.    No murmur heard.  Prolonged cap refill, nail beds are white at baseline   Pulmonary/Chest: Effort normal. No stridor. No respiratory distress. He has no decreased breath sounds. He has no wheezes. He has no rhonchi. He has no rales.   Left upper chest device site has a gauze & tegaderm dressing present that is without drainage, erythema, edema, or ecchymosis.   Abdominal: Soft. Bowel sounds are normal. He exhibits no distension. There is no tenderness. There is no rebound and no guarding.   Musculoskeletal: Normal range of motion. He exhibits no edema.   Neurological: He is alert and oriented to person, place, and time.   Skin: Skin is warm and dry. No rash noted. He is not diaphoretic. No erythema. No pallor.   Psychiatric: He has a normal mood and affect. His behavior is normal. Judgment and thought content normal.   Nursing note and vitals reviewed.    Hemodynamics:  Temp (24hrs), Av.6 °C (97.8 °F), Min:36.2 °C (97.2 °F), Max:36.7 °C (98.1 °F)  Temperature: 36.7 °C (98.1 °F)  Pulse  Av.7  Min: 51  Max: 94Heart Rate (Monitored): 72  Blood Pressure : 126/83, NIBP: 130/82       Respiratory:  Respiration: 18, Pulse Oximetry: 95 %    Fluids:  Date 18 0700 - 03/15/18 0659   Shift 4595-3415 6203-9288 3170-5008 24 Hour Total   I  N  T  A  K  E   P.O. 240   240    Shift Total 240   240    O  U  T  P  U  T   Shift Total       Weight (kg) 104.3 104.3 104.3 104.3     GI/Nutrition:  Orders Placed This Encounter   Procedures   • Diet Order     Standing Status:   Standing     Number of Occurrences:   1     Order Specific Question:   Diet:     Answer:   Cardiac [6]     Lab Results:  Recent Labs      03/13/18   0639   WBC  8.1   RBC  5.25   HEMOGLOBIN  15.8   HEMATOCRIT  46.1   MCV  87.8   MCH  30.1   MCHC  34.3   RDW  42.4   PLATELETCT  180   MPV  9.4     Recent Labs      03/13/18   1005  03/13/18   1507  03/14/18   0222   SODIUM  138  138  138   POTASSIUM  3.4*  3.7  3.6   CHLORIDE  109  105  107   CO2  24  26  23   GLUCOSE  95  97  95   BUN  15  19  18   CREATININE  0.43*  1.29  1.32   CALCIUM  8.4*  9.9  9.3     Recent Labs      03/13/18   0639   INR  1.25*     Medical Decision Making, by Problem:  Atrial fibrillation    Plan:  Continue tikosyn at 500 mcg BID with EKG's 2 hours after each dose.  40 mEq PO KCl x 1.  Recheck BMP & Mg in a.m.  Goal K > 4, Mg > 2.  Switch triamterene to aldactone due to contraindication to using with tikosyn.  Continue eliquis for AC.  DCCV tomorrow if not converted to SR by then. Consents printed.  Tikosyn rx provided to  for pricing & availability.  Device check in 1 week then again in 6 weeks (appts made & discussed with the patient).  Anticipate discharge home tomorrow after DCCV if uncomplicated & if QTc remains stable.    Quality-Core Measures   Reviewed items::  EKG reviewed, Labs reviewed, Medications reviewed and Radiology images reviewed  Kirkland catheter::  No Kirkland  DVT: Eliquis.  DVT prophylaxis - mechanical:  Not indicated at this time, ambulatory

## 2018-03-14 NOTE — DIETARY
"Nutrition services: Day 0 of admit.  Steven Bianchi is a 77 y.o. male with admitting DX of RECOVERY ONLY  Sinoatrial node dysfunction (CMS-HCC).  RD saw patient this morning for weight loss which was noted in nutrition admit screen.  Patient reports that his usual body weight is ~ 180 pounds and that he has noticed some weight loss in the last couple weeks related to feeling \"very ill\".  Questions accuracy of stated weight as patients admit weight was 230 pounds.  I encouraged PO with patient and discussed importance of adequate nutrition.  Offered snacks and supplements however at this time patient refused.     Assessment:  Height: 188 cm (6' 2\")  Weight: 104.3 kg (230 lb)  Body mass index is 29.53 kg/m².   Diet/Intake: Cardiac diet - % consumed per chart review however at bedside patient visibly consumed < 25%.      Evaluation:   1. History of CKD; GFR: 53    Malnutrition Risk: Patient at risk for malnutrition related to decreased appetite and possible weight loss as reported by patient.     Recommendations/Plan:  1. Encourage intake of meals.  2. Document intake of all meals  as % taken in ADL's to provide interdisciplinary communication across all shifts.   3. Monitor weight.  4. Nutrition rep will continue to see patient for ongoing meal and snack preferences.     RD monitoring    "

## 2018-03-14 NOTE — CARE PLAN
Problem: Safety  Goal: Will remain free from falls  Outcome: PROGRESSING AS EXPECTED  Discussed with patient the importance to use call light when assistance is needed. Patient verbalized understanding. Patient has call light within reach, treaded socks on, and hourly rounding in place.     Problem: Knowledge Deficit  Goal: Knowledge of disease process/condition, treatment plan, diagnostic tests, and medications will improve  Outcome: PROGRESSING AS EXPECTED  Patient educated regarding activity, diet, meds and plan of care. Patient verbalized understanding.

## 2018-03-15 VITALS
HEIGHT: 74 IN | TEMPERATURE: 97.8 F | RESPIRATION RATE: 20 BRPM | BODY MASS INDEX: 30.33 KG/M2 | HEART RATE: 79 BPM | DIASTOLIC BLOOD PRESSURE: 80 MMHG | SYSTOLIC BLOOD PRESSURE: 135 MMHG | OXYGEN SATURATION: 97 % | WEIGHT: 236.33 LBS

## 2018-03-15 PROBLEM — Z95.0 CARDIAC PACEMAKER IN SITU: Status: ACTIVE | Noted: 2018-03-13

## 2018-03-15 LAB
ANION GAP SERPL CALC-SCNC: 8 MMOL/L (ref 0–11.9)
BUN SERPL-MCNC: 19 MG/DL (ref 8–22)
CALCIUM SERPL-MCNC: 9.2 MG/DL (ref 8.5–10.5)
CHLORIDE SERPL-SCNC: 107 MMOL/L (ref 96–112)
CO2 SERPL-SCNC: 22 MMOL/L (ref 20–33)
CREAT SERPL-MCNC: 1.1 MG/DL (ref 0.5–1.4)
EKG IMPRESSION: NORMAL
EKG IMPRESSION: NORMAL
GLUCOSE SERPL-MCNC: 93 MG/DL (ref 65–99)
MAGNESIUM SERPL-MCNC: 2 MG/DL (ref 1.5–2.5)
POTASSIUM SERPL-SCNC: 3.9 MMOL/L (ref 3.6–5.5)
SODIUM SERPL-SCNC: 137 MMOL/L (ref 135–145)

## 2018-03-15 PROCEDURE — 83735 ASSAY OF MAGNESIUM: CPT

## 2018-03-15 PROCEDURE — A9270 NON-COVERED ITEM OR SERVICE: HCPCS | Performed by: INTERNAL MEDICINE

## 2018-03-15 PROCEDURE — 93010 ELECTROCARDIOGRAM REPORT: CPT | Performed by: INTERNAL MEDICINE

## 2018-03-15 PROCEDURE — 93005 ELECTROCARDIOGRAM TRACING: CPT | Performed by: INTERNAL MEDICINE

## 2018-03-15 PROCEDURE — 80048 BASIC METABOLIC PNL TOTAL CA: CPT

## 2018-03-15 PROCEDURE — 36415 COLL VENOUS BLD VENIPUNCTURE: CPT

## 2018-03-15 PROCEDURE — 700102 HCHG RX REV CODE 250 W/ 637 OVERRIDE(OP): Performed by: INTERNAL MEDICINE

## 2018-03-15 RX ORDER — POTASSIUM CHLORIDE 20 MEQ/1
20 TABLET, EXTENDED RELEASE ORAL DAILY
Status: DISCONTINUED | OUTPATIENT
Start: 2018-03-16 | End: 2018-03-15 | Stop reason: HOSPADM

## 2018-03-15 RX ORDER — DOFETILIDE 0.5 MG/1
500 CAPSULE ORAL EVERY 12 HOURS
Qty: 60 CAP | Refills: 3 | Status: SHIPPED | OUTPATIENT
Start: 2018-03-15 | End: 2018-04-20 | Stop reason: SDUPTHER

## 2018-03-15 RX ORDER — LISINOPRIL 20 MG/1
20 TABLET ORAL DAILY
Qty: 30 TAB | Refills: 6 | Status: SHIPPED | OUTPATIENT
Start: 2018-03-16 | End: 2018-04-20 | Stop reason: SDUPTHER

## 2018-03-15 RX ORDER — MIDAZOLAM HYDROCHLORIDE 1 MG/ML
INJECTION INTRAMUSCULAR; INTRAVENOUS
Status: COMPLETED
Start: 2018-03-15 | End: 2018-03-15

## 2018-03-15 RX ORDER — SPIRONOLACTONE 25 MG/1
12.5 TABLET ORAL 2 TIMES DAILY
Qty: 30 TAB | Refills: 3 | Status: SHIPPED | OUTPATIENT
Start: 2018-03-15 | End: 2018-04-20 | Stop reason: SDUPTHER

## 2018-03-15 RX ADMIN — DIBASIC SODIUM PHOSPHATE, MONOBASIC POTASSIUM PHOSPHATE AND MONOBASIC SODIUM PHOSPHATE 1 TABLET: 852; 155; 130 TABLET ORAL at 09:13

## 2018-03-15 RX ADMIN — FINASTERIDE 5 MG: 5 TABLET, FILM COATED ORAL at 09:13

## 2018-03-15 RX ADMIN — VITAMIN D, TAB 1000IU (100/BT) 1000 UNITS: 25 TAB at 09:13

## 2018-03-15 RX ADMIN — APIXABAN 5 MG: 5 TABLET, FILM COATED ORAL at 09:12

## 2018-03-15 RX ADMIN — POTASSIUM CHLORIDE 10 MEQ: 1500 TABLET, EXTENDED RELEASE ORAL at 09:13

## 2018-03-15 RX ADMIN — LISINOPRIL 20 MG: 20 TABLET ORAL at 09:13

## 2018-03-15 RX ADMIN — DOXAZOSIN 4 MG: 2 TABLET ORAL at 09:13

## 2018-03-15 RX ADMIN — DOFETILIDE 500 MCG: 0.5 CAPSULE ORAL at 09:14

## 2018-03-15 RX ADMIN — Medication 100 MG: at 09:13

## 2018-03-15 RX ADMIN — MAGNESIUM GLUCONATE 500 MG ORAL TABLET 400 MG: 500 TABLET ORAL at 09:13

## 2018-03-15 ASSESSMENT — PAIN SCALES - GENERAL: PAINLEVEL_OUTOF10: 0

## 2018-03-15 NOTE — DISCHARGE INSTRUCTIONS
Discharge Instructions    Discharged to home by car with relative. Discharged via wheelchair, hospital escort: Yes.  Special equipment needed: Not Applicable    Be sure to schedule a follow-up appointment with your primary care doctor or any specialists as instructed.     Pacemaker Discharge Instructions/[facility]   1.  No showers for one week; may take sponge bath.  Keep dressing dry & in place until seen at for you follow up visit at the cardiology office.   2.  No lifting over 10 lbs for six weeks.   3.  Do not raise affected arm above shoulder level or behind head for six weeks.   4.  Avoid excessive pushing, pulling, or twisting for six weeks.   5.  No driving for the first week.   6.  Call our office ([phone]) if you notice any increased swelling, redness, warmth, or drainage at the implant site.   7.  Call our office if you develop fever > 101F or uncontrolled pain.   8.  No MRI's for 6 weeks if you have a MRI compatible device.   9.  No dental work or cleanings for 3 months.   10.  May remove arm sling after one day, but please wear if you have trouble remembering to keep your arm down.   11. Do not place cell phones or mobile devices directly over implanted device.   12. Your follow-up appointments will be done at approximately 1 week, 6 weeks, then every 3-4 months.            Discharge Plan:   Diet Plan: Discussed  Activity Level: Discussed  Confirmed Follow up Appointment: Appointment Scheduled  Confirmed Symptoms Management: Discussed  Medication Reconciliation Updated: Yes  Influenza Vaccine Indication: Not indicated: Previously immunized this influenza season and > 8 years of age    I understand that a diet low in cholesterol, fat, and sodium is recommended for good health. Unless I have been given specific instructions below for another diet, I accept this instruction as my diet prescription.   Other diet: cardiac    Special Instructions: None    · Is patient discharged on Warfarin /  Coumadin?   No     Depression / Suicide Risk    As you are discharged from this [facility] facility, it is important to learn how to keep safe from harming yourself.    Recognize the warning signs:  · Abrupt changes in personality, positive or negative- including increase in energy   · Giving away possessions  · Change in eating patterns- significant weight changes-  positive or negative  · Change in sleeping patterns- unable to sleep or sleeping all the time   · Unwillingness or inability to communicate  · Depression  · Unusual sadness, discouragement and loneliness  · Talk of wanting to die  · Neglect of personal appearance   · Rebelliousness- reckless behavior  · Withdrawal from people/activities they love  · Confusion- inability to concentrate     If you or a loved one observes any of these behaviors or has concerns about self-harm, here's what you can do:  · Talk about it- your feelings and reasons for harming yourself  · Remove any means that you might use to hurt yourself (examples: pills, rope, extension cords, firearm)  · Get professional help from the community (Mental Health, Substance Abuse, psychological counseling)  · Do not be alone:Call your Safe Contact- someone whom you trust who will be there for you.  · Call your local CRISIS HOTLINE 554-7341 or 503-342-5781  · Call your local Children's Mobile Crisis Response Team [place] (574) 419-0479 or www.Yooneed.com  · Call the toll free National Suicide Prevention Hotlines   · National Suicide Prevention Lifeline 392-238-EKJC (1580)  · National Hope Line Network 800-SUICIDE (686-9865)

## 2018-03-15 NOTE — DISCHARGE SUMMARY
CHIEF COMPLAINT ON ADMISSION  Atrial fibrillation with slow ventricular response & pauses    CODE STATUS  Full Code    HPI & HOSPITAL COURSE  This is a 77 y.o. male admitted electively for PPM placement secondary to AF with slow ventricular responses & pauses.  A dual chamber PPM ([employer] model) was successfully placed by Dr. Simpson on 3/13/18.  The device site remains clean, dry, & intact, and is without edema, ecchymosis, or erythema. There was no pneumothorax on his follow up CXR. Both leads appear appropriately positioned.  Has been AF with intermittent v-pacing on tele, but converted to SR just prior to his scheduled cardioversion on 3/15/18.  His VS have remained stable during his admission.  Device interrogation done this morning showed stable function.  RA: 2.2 mV, 0.4 V @ 0.4 ms, 483 ohms  RV: 14 mV, 0.4 V @ 0.4 ms, 825 ohms     K 3.9, Mg 2, CrCl 85 mL/min this morning. On PO mag & potassium supplementation.  Lab slip provided for repeat BMP & Mg in 1 week.  Because the patient is now on tikosyn, his triamterene was changed to aldactone & prinzide was changed to lisinopril only.  Tikosyn rx was submitted to  for pricing & availability prior to discharge, & is affordable per the patient's report.     QTc intervals remained stable & are as follows:  Baseline: Pre- ms, Post-PPM in v-paced rhythm 487 ms  After 1st dose: 496 ms  After 2nd dose: 487 ms (native beat)   **no EKG done for 2nd dose last night, was obtained just before 3rd dose this morning  After 3rd dose: 488 ms (native beat)  After 4th dose: 548 ms (paced beat), 428 ms (native beat)  After 5th dose: 529 ms (paced beat), now underlying SR     Pacemaker instructions/restrictions were reviewed with the patient & his wife at the bedside.  1.  No showers for one week; may take sponge bath.  Keep dressing dry & in place until seen at for you follow up visit at the cardiology office.  2.  No lifting over 10 lbs for six weeks.  3.  Do not  raise affected arm above shoulder level or behind head for six weeks.  4.  Avoid excessive pushing, pulling, or twisting for six weeks.  5.  No driving for the first week.  6.  Call our office ([phone]) if you notice any increased swelling, redness, warmth, or drainage at the implant site.  7.  Call our office if you develop fever > 101F or uncontrolled pain.  8.  No MRI's for 6 weeks if you have a MRI compatible device.  9.  No dental work or cleanings for 3 months.  10.  May remove arm sling after one day, but please wear if you have trouble remembering to keep your arm down.  11. Do not place cell phones or mobile devices directly over implanted device.   12. Your follow-up appointments will be done at approximately 1 week, 6 weeks, then every 3-4 months.    Therefore, he is discharged in good and stable condition with close outpatient follow-up.    DISCHARGE PROBLEM LIST  Active Problems:    Cardiac pacemaker in situ POA: Unknown      Overview: Dual chamber, [employer] model, placed by Dr. Simpson on 3/13/18 for       AF with SVR & pauses  Resolved Problems:    * No resolved hospital problems. *    FOLLOW UP  Future Appointments  Date Time Provider Department Center   3/21/2018 1:15 PM PACER CHECK-[facility] None     MEDICATIONS ON DISCHARGE   Steven Bianchi   Home Medication Instructions JERMAIN:[id number]    Printed on:03/15/18 1059   Medication Information                      amLODIPine (NORVASC) 2.5 MG Tab  Take 1 Tab by mouth as needed. For BP >140 systolic             apixaban (ELIQUIS) 5mg Tab  Take 1 Tab by mouth 2 Times a Day.             Cholecalciferol (VITAMIN D-3) 1000 units Cap  Take 1 Tab by mouth every day.             dofetilide (TIKOSYN) 500 MCG Cap  Take 1 Cap by mouth every 12 hours.             doxazosin (CARDURA) 4 MG Tab  Take 1 Tab by mouth 2 Times a Day.             finasteride (PROSCAR) 5 MG Tab  Take 1 Tab by mouth every day.             lisinopril (PRINIVIL) 20 MG Tab  Take 1  Tab by mouth every day.             magnesium oxide (MAG-OX) 400 MG Tab  Take 400 mg by mouth every day.             phosphorus (K-PHOS-NEUTRAL, PHOSPHA 250 NEUTRAL) 155-852-130 MG tablet  Take 1 Tab by mouth 2 times a day.             potassium chloride ER (KLOR-CON) 10 MEQ tablet  Take 1 Tab by mouth every day.             spironolactone (ALDACTONE) 25 MG Tab  Take 0.5 Tabs by mouth 2 Times a Day.             thiamine (THIAMINE) 100 MG Tab  Take 100 mg by mouth every day.               DIET  Orders Placed This Encounter   Procedures   • DIET NPO     Standing Status:   Standing     Number of Occurrences:   8     Order Specific Question:   Restrict to:     Answer:   Sips with Medications [3]     ACTIVITY  As tolerated.  Exercise encouraged.  Do not lift LEFT arm above the shoulder    LABORATORY  Lab Results   Component Value Date/Time    SODIUM 137 03/15/2018 02:17 AM    POTASSIUM 3.9 03/15/2018 02:17 AM    CHLORIDE 107 03/15/2018 02:17 AM    CO2 22 03/15/2018 02:17 AM    GLUCOSE 93 03/15/2018 02:17 AM    BUN 19 03/15/2018 02:17 AM    CREATININE 1.10 03/15/2018 02:17 AM      Lab Results   Component Value Date/Time    WBC 8.1 03/13/2018 06:39 AM    HEMOGLOBIN 15.8 03/13/2018 06:39 AM    HEMATOCRIT 46.1 03/13/2018 06:39 AM    PLATELETCT 180 03/13/2018 06:39 AM      Total time of the discharge process exceeds 40 minutes

## 2018-03-15 NOTE — PROGRESS NOTES
Assumed care at 1900, bedside report received from day shift RN. Pt. Is paced on the monitor. Initial assessment completed, orders reviewed, call light within reach, and hourly rounding in place. POC addressed with patient, no additional questions at this time.

## 2018-03-20 ENCOUNTER — TELEPHONE (OUTPATIENT)
Dept: CARDIOLOGY | Facility: MEDICAL CENTER | Age: 78
End: 2018-03-20

## 2018-03-20 DIAGNOSIS — Z79.899 HIGH RISK MEDICATION USE: ICD-10-CM

## 2018-03-21 ENCOUNTER — NON-PROVIDER VISIT (OUTPATIENT)
Dept: CARDIOLOGY | Facility: MEDICAL CENTER | Age: 78
End: 2018-03-21
Payer: MEDICARE

## 2018-03-21 DIAGNOSIS — Z95.0 CARDIAC PACEMAKER IN SITU: ICD-10-CM

## 2018-03-21 PROCEDURE — 93280 PM DEVICE PROGR EVAL DUAL: CPT | Performed by: INTERNAL MEDICINE

## 2018-03-21 NOTE — NON-PROVIDER
"S/p new pacemaker, implanted on 3-. Appropriate device function demonstrated. Wound site appears clear. Advised to watch for redness,swelling, oozing or fever. Pt verbalized understanding. See back in 5 weeks for final testing in our [facility] office.  Addendum: pt asking when he can proceed with prostrate surgery? S/w Dr. Simpson, advised \"anytime\", asked that urologist submit surgical request to office with date/time of procedure. Pt has upcoming appt, he will s/w urology.    "

## 2018-03-22 NOTE — DOCUMENTATION QUERY
"DOCUMENTATION QUERY    PROVIDERS: Please select “Cosign w/ note”to reply to query.    To better represent the severity of illness of your patient, please review the following information and exercise your independent professional judgment in responding to this query.     Patient presented for pacemaker insertion. Progress Notes dated 03/13 document\"K 3.4, repleted with 40 mEq PO x 1.  Mg 1.8, repleted with 2 g IV mag sulfate x 1.Orders placed to recheck labs in the a.m.\" Discharge Summary documents \"K 3.9, Mg 2, CrCl 85 mL/min this morning. On PO mag & potassium supplementation.  Lab slip provided for repeat BMP & Mg in 1 week.\" Coding from lab values is prohibited without physician documentation of a diagnosis.    There is no associated diagnosis documented for these findings.    Based on clinical findings, risk factors and treatment, can a diagnosis be assigned to these clinical findings?      The medical record reflects the following:   Clinical Findings  Potassium-3.4 up to 3.9 at discharge   Magnesium-1.8 up to 2 at discharge   Treatment  electrolyte repletion    Risk Factors     Location within medical record  Progress Notes, Discharge Summary and Lab Results      Thank you,   Makenzie Geronimo          "

## 2018-03-22 NOTE — ADDENDUM NOTE
Encounter addended by: Makenzie Geronimo on: 3/22/2018  8:30 AM<BR>    Actions taken: Sign clinical note

## 2018-03-22 NOTE — ADDENDUM NOTE
Encounter addended by: Makenzie Geronimo on: 3/22/2018  3:14 AM<BR>    Actions taken: Pend clinical note

## 2018-04-04 ENCOUNTER — TELEPHONE (OUTPATIENT)
Dept: CARDIOLOGY | Facility: MEDICAL CENTER | Age: 78
End: 2018-04-04

## 2018-04-04 DIAGNOSIS — I48.0 PAF (PAROXYSMAL ATRIAL FIBRILLATION) (HCC): ICD-10-CM

## 2018-04-05 ENCOUNTER — TELEPHONE (OUTPATIENT)
Dept: VASCULAR LAB | Facility: MEDICAL CENTER | Age: 78
End: 2018-04-05

## 2018-04-05 ENCOUNTER — TELEPHONE (OUTPATIENT)
Dept: CARDIOLOGY | Facility: MEDICAL CENTER | Age: 78
End: 2018-04-05

## 2018-04-05 NOTE — TELEPHONE ENCOUNTER
Per , pt is low risk for turp, faxed to [facility], sent to scan. Advised RNWCC to advise on anticoagulant. Referral placed.

## 2018-04-05 NOTE — TELEPHONE ENCOUNTER
Received referral asking for guidance regarding Eliquis for TURP.   Pt has a CHADsVasc = 2 (age, HTN)    Advised to hold Eliquis 48 hours prior to procedure, pt to resume as per operating MD.     Nasra Borjas, PharmD

## 2018-04-05 NOTE — TELEPHONE ENCOUNTER
"Spoke to wife. Reports that site does not seem \"raised\" at this time. Denies redness, swelling, fever/chills or s/s of infection. Encouraged to call with any further questions or concerns.   "

## 2018-04-05 NOTE — TELEPHONE ENCOUNTER
----- Message from Nona Millan sent at 4/5/2018  9:45 AM PDT -----  Regarding: Problem with Pacemaker site being raised up  RORY/Deana    Patient had a Pacemaker implanted on 3/13 by Dr Simpson. He said that the Pacemaker site is raised up and he wants to find out what he should do. He can be reached at [phone].

## 2018-04-09 ENCOUNTER — TELEPHONE (OUTPATIENT)
Dept: CARDIOLOGY | Facility: MEDICAL CENTER | Age: 78
End: 2018-04-09

## 2018-04-09 NOTE — TELEPHONE ENCOUNTER
Wife concerned pt. was put on levaquin for infection in testicle by ER MD @ [facility], but then pharmacy wouldn't fill due to warning w/ Tyaniasyn. After many calls to [facility], pt. was put on bactrim. Wife very concerned about whether this drug is OK? Also has concerns about infection & implications for his new PM & worried about continuity of care with Dr. Simpson departure. Pt. needs prostate surgery but couldn't have until cardiac issues were resolved. She has many questions. Has pre-op appt. tomorrow w/ Dr. Villarreal, urologist in [place]. I advised wife I'd have Dr. Simpson nurse call her. To WENDY Frausto

## 2018-04-09 NOTE — TELEPHONE ENCOUNTER
----- Message from Nona Millan sent at 4/9/2018  9:04 AM PDT -----  Regarding: Patient's wife wants call back  AB/Julissa    Patient's wife, Lady, wants a call back at [phone]. Her  went to [facility] on Saturday, 4/7, with an infection in his testicles and she needs to discuss medications.

## 2018-04-09 NOTE — TELEPHONE ENCOUNTER
Pt had recent PM 3/13 so he could get clearnance for necessary prostate surgery. He was cleared by May last week at low risk but over weekend went into hospital with infected testicle. They started him on Levaquin which wife pointed out to them he could not take that with tikposyn, He is now on Bactrim. Discussed Implications of infected PM. She states swelling from last week is gone. There are no s/s of infections, redness swelling, etc. Advised her to contact us if anything symtoms arise. See Urologist tomorrow see what he says about upcoming surgery. If pt needs to be cleared again after hospitalization let us know.     TO ds: IS BACTRIM OK WITH TIKOSYN? OK TO PROCEED WITH PROSTATE SURGERY?

## 2018-04-10 NOTE — TELEPHONE ENCOUNTER
Bactrim interacts with tikosyn and should be avoided, I have never seen him so I am unsure of the clearance although from the chert his risk does not appear too high

## 2018-04-11 NOTE — TELEPHONE ENCOUNTER
To DS:    prostate infection is so bad per urology visit yesterday that  extended bactrim course.Prostate is size of grapefruit and painful she reports.  Wife will call urology and get antibiotic changed if you are able to tell us what antibiotic is ok with Tikosyn since list is limited.     Pt notified. Will see AB on 4/24 and get clearance with [facility]. Message to [employer] to squeeze back into [facility] possibly 5/7 so pt has dr following him.

## 2018-04-13 NOTE — TELEPHONE ENCOUNTER
RORY/uma     Pt's wife Lady calling to continue on with the conversation Uma had with Lady yesterday about the prostate infection and the antibiotic therapy.     Please call Lady at [phone] .    Spoke with Lady who explained Dr. Villarreal will be ordering 10 days of infusion therapy and Dr. Villarrela will be sending information to our office.   Advised discussing face to face with Dr. Villarreal the possible interaction between Tikosyn and infusion medication, as there has been confusion regarding this.      She asked us to be aware that Dr. Villarreal will be sending over information.

## 2018-04-20 ENCOUNTER — OFFICE VISIT (OUTPATIENT)
Dept: CARDIOLOGY | Facility: MEDICAL CENTER | Age: 78
End: 2018-04-20
Payer: MEDICARE

## 2018-04-20 VITALS
HEART RATE: 100 BPM | DIASTOLIC BLOOD PRESSURE: 70 MMHG | BODY MASS INDEX: 28.47 KG/M2 | SYSTOLIC BLOOD PRESSURE: 120 MMHG | HEIGHT: 75 IN | OXYGEN SATURATION: 96 % | WEIGHT: 229 LBS

## 2018-04-20 DIAGNOSIS — R94.30 ABNORMAL CARDIAC FUNCTION TEST: ICD-10-CM

## 2018-04-20 DIAGNOSIS — Z79.899 VISIT FOR MONITORING TIKOSYN THERAPY: ICD-10-CM

## 2018-04-20 DIAGNOSIS — I10 ESSENTIAL HYPERTENSION: ICD-10-CM

## 2018-04-20 DIAGNOSIS — I50.22 CHF (CONGESTIVE HEART FAILURE), NYHA CLASS II, CHRONIC, SYSTOLIC (HCC): ICD-10-CM

## 2018-04-20 DIAGNOSIS — Z51.81 VISIT FOR MONITORING TIKOSYN THERAPY: ICD-10-CM

## 2018-04-20 DIAGNOSIS — I25.2 HISTORY OF MI (MYOCARDIAL INFARCTION): Chronic | ICD-10-CM

## 2018-04-20 DIAGNOSIS — I48.0 PAROXYSMAL ATRIAL FIBRILLATION (HCC): ICD-10-CM

## 2018-04-20 DIAGNOSIS — Z79.01 CHRONIC ANTICOAGULATION: Chronic | ICD-10-CM

## 2018-04-20 LAB — EKG IMPRESSION: NORMAL

## 2018-04-20 PROCEDURE — 93000 ELECTROCARDIOGRAM COMPLETE: CPT | Performed by: INTERNAL MEDICINE

## 2018-04-20 PROCEDURE — 99214 OFFICE O/P EST MOD 30 MIN: CPT | Mod: 25 | Performed by: INTERNAL MEDICINE

## 2018-04-20 RX ORDER — DOFETILIDE 0.5 MG/1
500 CAPSULE ORAL EVERY 12 HOURS
Qty: 180 CAP | Refills: 3 | Status: SHIPPED | OUTPATIENT
Start: 2018-04-20 | End: 2019-11-11

## 2018-04-20 RX ORDER — LISINOPRIL 20 MG/1
20 TABLET ORAL DAILY
Qty: 90 TAB | Refills: 3 | Status: SHIPPED | OUTPATIENT
Start: 2018-04-20

## 2018-04-20 RX ORDER — SPIRONOLACTONE 25 MG/1
12.5 TABLET ORAL 2 TIMES DAILY
Qty: 90 TAB | Refills: 3 | Status: SHIPPED | OUTPATIENT
Start: 2018-04-20 | End: 2019-11-11

## 2018-04-20 ASSESSMENT — ENCOUNTER SYMPTOMS
FOCAL WEAKNESS: 0
SHORTNESS OF BREATH: 0
NAUSEA: 0
SORE THROAT: 0
BRUISES/BLEEDS EASILY: 0
DIZZINESS: 0
FEVER: 0
FALLS: 0
CLAUDICATION: 0
WEAKNESS: 0
COUGH: 0
PALPITATIONS: 0
PND: 0
ABDOMINAL PAIN: 0
BLURRED VISION: 0
CHILLS: 0

## 2018-04-20 NOTE — LETTER
[facility]   [address], [address]  [place] [address]  Phone: [phone]  Fax: [phone]              Steven Bianchi  1940    Encounter Date: 4/20/2018    Damaso Dai M.D.          PROGRESS NOTE:  Chief Complaint   Patient presents with   • Atrial Fibrillation     follow up /pp of Isamar/ establish with Dr Dai       Subjective:   Steven Bianchi is a 77 y.o. 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   • Back pain    • BPH (benign prostatic hyperplasia)     Followed by urology ([place])   • Cardiac pacemaker in situ - [employer] 2018     Dual chamber, [employer] model, placed by Dr. Simpson on 3/13/18 for AF with SVR & pauses   • Chronic anticoagulation    • Chronic atrial fibrillation (CMS-[facility]) 07/2017    Echocardiogram with normal LV size, mild septal hypertrophy, LVEF 50%. Inferior/apical hypokinesis. Moderately dilated RV. Enlarged RA and LA. Mild MR, mild TR. RVSP 20mmHg. Ascending aorta 3.8cm   • Chronic kidney disease     Followed by nephrology ([facility])   • Hematuria    • History of MI (myocardial infarction) - inferior    • Hyperlipidemia    • Hypertension    • Knee pain    • Pacemaker       Past Surgical History:   Procedure Laterality Date   • PACEMAKER INSERTION  03/13/2018   • PROSTATECTOMY, RADIAL  2005   • HEMORRHOIDECTOMY  1982     Family History   Problem Relation Age of Onset   • Cancer Mother    • Heart Disease Father    • Cancer Sister    • Cancer Brother      Social History     Social History   • Marital status:      Spouse name: N/A   • Number of children: N/A   • Years of education: N/A     Occupational History   • Not on file.     Social History Main Topics   • Smoking status: Never Smoker   • Smokeless tobacco: Never Used   • Alcohol use 1.0 oz/week     2 Standard drinks or equivalent per week   • Drug use: No   • Sexual activity: Not on file     Other Topics Concern   • Not on file     Social History Narrative   • No narrative on file     No Known Allergies  Outpatient Encounter Prescriptions as of 4/20/2018   Medication Sig Dispense Refill   • dofetilide (TIKOSYN) 500 MCG Cap Take 1 Cap by mouth every 12 hours. 180 Cap 3   • lisinopril (PRINIVIL) 20 MG Tab Take 1 Tab by mouth every day. 90 Tab 3   • spironolactone (ALDACTONE) 25 MG Tab Take 0.5 Tabs by mouth 2 Times a Day. 90 Tab 3   • apixaban (ELIQUIS) 5mg Tab Take 1 Tab by mouth 2 Times a Day. 180 Tab 3   • magnesium oxide (MAG-OX) 400 MG Tab Take 400 mg by mouth every day.     • phosphorus (K-PHOS-NEUTRAL, PHOSPHA 250 NEUTRAL) 155-852-130 MG tablet Take 1 Tab by mouth 2 times a day.     • amLODIPine (NORVASC) 2.5 MG Tab Take 1 Tab by mouth as needed. For BP >140 systolic 1 Tab 1   • finasteride (PROSCAR) 5 MG Tab Take 1 Tab by mouth every day. 1 Tab 1   • potassium chloride ER (KLOR-CON) 10 MEQ tablet Take 1 Tab by mouth every day. 1 Tab 1   • Cholecalciferol (VITAMIN D-3) 1000 units Cap Take 1 Tab by mouth every day. 1 Cap 1   • doxazosin (CARDURA) 4 MG Tab Take 1 Tab by mouth 2 Times a Day. 1 Tab 1   • [DISCONTINUED] dofetilide (TIKOSYN) 500 MCG Cap Take 1 Cap by mouth every 12 hours. 60 Cap 3   • [DISCONTINUED]  "lisinopril (PRINIVIL) 20 MG Tab Take 1 Tab by mouth every day. 30 Tab 6   • [DISCONTINUED] spironolactone (ALDACTONE) 25 MG Tab Take 0.5 Tabs by mouth 2 Times a Day. 30 Tab 3   • [DISCONTINUED] apixaban (ELIQUIS) 5mg Tab Take 1 Tab by mouth 2 Times a Day. 180 Tab 1     No facility-administered encounter medications on file as of 4/20/2018.      Review of Systems   Constitutional: Positive for malaise/fatigue. Negative for chills and fever.   HENT: Negative for sore throat.    Eyes: Negative for blurred vision.   Respiratory: Negative for cough and shortness of breath.    Cardiovascular: Negative for chest pain, palpitations, claudication, leg swelling and PND.   Gastrointestinal: Negative for abdominal pain and nausea.   Genitourinary: Positive for frequency. Negative for hematuria and urgency.   Musculoskeletal: Negative for falls and joint pain.   Skin: Negative for rash.   Neurological: Negative for dizziness, focal weakness and weakness.   Endo/Heme/Allergies: Does not bruise/bleed easily.        Objective:   /70   Pulse 100   Ht 1.905 m (6' 3\")   Wt 103.9 kg (229 lb)   SpO2 96%   BMI 28.62 kg/m²      Physical Exam   Constitutional: No distress.   HENT:   Mouth/Throat: Oropharynx is clear and moist. No oropharyngeal exudate.   Eyes: No scleral icterus.   Neck: No JVD present.   Cardiovascular: Normal rate and normal heart sounds.  Exam reveals no gallop and no friction rub.    No murmur heard.  Pulmonary/Chest: No respiratory distress. He has no wheezes. He has no rales.   Abdominal: Soft. Bowel sounds are normal.   Musculoskeletal: He exhibits no edema.   Neurological: He is alert.   Skin: No rash noted. He is not diaphoretic.   Psychiatric: He has a normal mood and affect.     His EKG shows atrial sensed ventricular paced complexes  in the setting of pacing    Labs show stable chronic kidney disease  Assessment:     1. Paroxysmal atrial fibrillation (CMS-HCC)     2. Chronic anticoagulation    "   3. Essential hypertension  EKG   4. Abnormal cardiac function test  EKG   5. History of MI (myocardial infarction) - inferior  EKG   6. CHF (congestive heart failure), NYHA class II, chronic, systolic (CMS-[facility]) - EF 50% 2017  EKG   7. Visit for monitoring Tikosyn therapy  BASIC METABOLIC PANEL    EKG       Medical Decision Making:  Today's Assessment / Status / Plan:     It was my pleasure to meet with Mr. Bianchi.    Is accompanied by his wife    fOr his Tikosyn therapy I encouraged him to follow up at least 4 times a year on his creatinine which he had been doing with Dr. Grossman's office as it were.  We'll also check an EKG at least twice a year with his pacemaker and standard pacemaker checks.    He understands the importance of chronic anticoagulation    I will see Mr. Bianchi back in 6 months time and encouraged him to follow up with us over the phone or e-mail using my MyChart as issues arise.    It is my pleasure to participate in the care of Mr. Bianchi.  Please do not hesitate to contact me with questions or concerns.    Damaso Dai MD PhD FACC  Cardiologist [facility]    To the complex nature due to the complex nature of his care including discussion virus medication with Tikosyn this qualifies as a level V follow-up      Divina Grossman M.D.  [address]  [facility] [address]  VIA Facsimile: [phone]     Hernán Villarreal M.D.  [address] [facility] [address]  VIA Facsimile: [phone]

## 2018-04-21 NOTE — PROGRESS NOTES
Chief Complaint   Patient presents with   • Atrial Fibrillation     follow up /pp of Isamar/ nav with Dr Dai       Subjective:   Steven Bianchi is a 77 y.o. 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   • Back pain    • BPH (benign prostatic hyperplasia)     Followed by urology ([place])   • Cardiac pacemaker in situ - [employer] 2018     Dual chamber, [place] [employer] model, placed by Dr. Simpson on 3/13/18 for AF with SVR & pauses   • Chronic anticoagulation    • Chronic atrial fibrillation (CMS-[facility]) 07/2017    Echocardiogram with normal LV size, mild septal hypertrophy, LVEF 50%. Inferior/apical hypokinesis. Moderately dilated RV. Enlarged RA and LA. Mild MR, mild TR. RVSP 20mmHg. Ascending aorta 3.8cm   • Chronic kidney disease     Followed by nephrology ([facility])   • Hematuria    • History of MI (myocardial infarction) - inferior    • Hyperlipidemia    • Hypertension    • Knee pain    • Pacemaker      Past Surgical History:   Procedure Laterality Date   • PACEMAKER INSERTION  03/13/2018   • PROSTATECTOMY, RADIAL  2005   • HEMORRHOIDECTOMY  1982     Family History   Problem Relation Age of Onset   • Cancer Mother    • Heart Disease Father    • Cancer Sister     • Cancer Brother      Social History     Social History   • Marital status:      Spouse name: N/A   • Number of children: N/A   • Years of education: N/A     Occupational History   • Not on file.     Social History Main Topics   • Smoking status: Never Smoker   • Smokeless tobacco: Never Used   • Alcohol use 1.0 oz/week     2 Standard drinks or equivalent per week   • Drug use: No   • Sexual activity: Not on file     Other Topics Concern   • Not on file     Social History Narrative   • No narrative on file     No Known Allergies  Outpatient Encounter Prescriptions as of 4/20/2018   Medication Sig Dispense Refill   • dofetilide (TIKOSYN) 500 MCG Cap Take 1 Cap by mouth every 12 hours. 180 Cap 3   • lisinopril (PRINIVIL) 20 MG Tab Take 1 Tab by mouth every day. 90 Tab 3   • spironolactone (ALDACTONE) 25 MG Tab Take 0.5 Tabs by mouth 2 Times a Day. 90 Tab 3   • apixaban (ELIQUIS) 5mg Tab Take 1 Tab by mouth 2 Times a Day. 180 Tab 3   • magnesium oxide (MAG-OX) 400 MG Tab Take 400 mg by mouth every day.     • phosphorus (K-PHOS-NEUTRAL, PHOSPHA 250 NEUTRAL) 155-852-130 MG tablet Take 1 Tab by mouth 2 times a day.     • amLODIPine (NORVASC) 2.5 MG Tab Take 1 Tab by mouth as needed. For BP >140 systolic 1 Tab 1   • finasteride (PROSCAR) 5 MG Tab Take 1 Tab by mouth every day. 1 Tab 1   • potassium chloride ER (KLOR-CON) 10 MEQ tablet Take 1 Tab by mouth every day. 1 Tab 1   • Cholecalciferol (VITAMIN D-3) 1000 units Cap Take 1 Tab by mouth every day. 1 Cap 1   • doxazosin (CARDURA) 4 MG Tab Take 1 Tab by mouth 2 Times a Day. 1 Tab 1   • [DISCONTINUED] dofetilide (TIKOSYN) 500 MCG Cap Take 1 Cap by mouth every 12 hours. 60 Cap 3   • [DISCONTINUED] lisinopril (PRINIVIL) 20 MG Tab Take 1 Tab by mouth every day. 30 Tab 6   • [DISCONTINUED] spironolactone (ALDACTONE) 25 MG Tab Take 0.5 Tabs by mouth 2 Times a Day. 30 Tab 3   • [DISCONTINUED] apixaban (ELIQUIS) 5mg Tab Take 1 Tab by mouth 2 Times a Day. 180 Tab 1  "    No facility-administered encounter medications on file as of 4/20/2018.      Review of Systems   Constitutional: Positive for malaise/fatigue. Negative for chills and fever.   HENT: Negative for sore throat.    Eyes: Negative for blurred vision.   Respiratory: Negative for cough and shortness of breath.    Cardiovascular: Negative for chest pain, palpitations, claudication, leg swelling and PND.   Gastrointestinal: Negative for abdominal pain and nausea.   Genitourinary: Positive for frequency. Negative for hematuria and urgency.   Musculoskeletal: Negative for falls and joint pain.   Skin: Negative for rash.   Neurological: Negative for dizziness, focal weakness and weakness.   Endo/Heme/Allergies: Does not bruise/bleed easily.        Objective:   /70   Pulse 100   Ht 1.905 m (6' 3\")   Wt 103.9 kg (229 lb)   SpO2 96%   BMI 28.62 kg/m²     Physical Exam   Constitutional: No distress.   HENT:   Mouth/Throat: Oropharynx is clear and moist. No oropharyngeal exudate.   Eyes: No scleral icterus.   Neck: No JVD present.   Cardiovascular: Normal rate and normal heart sounds.  Exam reveals no gallop and no friction rub.    No murmur heard.  Pulmonary/Chest: No respiratory distress. He has no wheezes. He has no rales.   Abdominal: Soft. Bowel sounds are normal.   Musculoskeletal: He exhibits no edema.   Neurological: He is alert.   Skin: No rash noted. He is not diaphoretic.   Psychiatric: He has a normal mood and affect.     His EKG shows atrial sensed ventricular paced complexes  in the setting of pacing    Labs show stable chronic kidney disease  Assessment:     1. Paroxysmal atrial fibrillation (CMS-HCC)     2. Chronic anticoagulation     3. Essential hypertension  EKG   4. Abnormal cardiac function test  EKG   5. History of MI (myocardial infarction) - inferior  EKG   6. CHF (congestive heart failure), NYHA class II, chronic, systolic (CMS[facility]) - EF 50% 2017  EKG   7. Visit for monitoring Tikosyn " therapy  BASIC METABOLIC PANEL    EKG       Medical Decision Making:  Today's Assessment / Status / Plan:     It was my pleasure to meet with Mr. Bianchi.    Is accompanied by his wife    fOr his Tikosyn therapy I encouraged him to follow up at least 4 times a year on his creatinine which he had been doing with Dr. Grossman's office as it were.  We'll also check an EKG at least twice a year with his pacemaker and standard pacemaker checks.    He understands the importance of chronic anticoagulation    I will see Mr. Bianchi back in 6 months time and encouraged him to follow up with us over the phone or e-mail using my MyChart as issues arise.    It is my pleasure to participate in the care of Mr. Bianchi.  Please do not hesitate to contact me with questions or concerns.    Damaso Dai MD PhD FACC  Cardiologist [facility]    To the complex nature due to the complex nature of his care including discussion virus medication with Tikosyn this qualifies as a level V follow-up

## 2018-04-25 ENCOUNTER — NON-PROVIDER VISIT (OUTPATIENT)
Dept: CARDIOLOGY | Facility: PHYSICIAN GROUP | Age: 78
End: 2018-04-25
Payer: MEDICARE

## 2018-04-25 VITALS
HEART RATE: 78 BPM | HEIGHT: 75 IN | OXYGEN SATURATION: 98 % | SYSTOLIC BLOOD PRESSURE: 110 MMHG | DIASTOLIC BLOOD PRESSURE: 70 MMHG

## 2018-04-25 DIAGNOSIS — I49.5 TACHY-BRADY SYNDROME (HCC): Chronic | ICD-10-CM

## 2018-04-25 DIAGNOSIS — Z79.01 CHRONIC ANTICOAGULATION: Chronic | ICD-10-CM

## 2018-04-25 DIAGNOSIS — Z95.0 CARDIAC PACEMAKER IN SITU: Chronic | ICD-10-CM

## 2018-04-25 DIAGNOSIS — I48.0 PAROXYSMAL ATRIAL FIBRILLATION (HCC): ICD-10-CM

## 2018-04-25 PROCEDURE — 93288 INTERROG EVL PM/LDLS PM IP: CPT | Performed by: NURSE PRACTITIONER

## 2018-04-25 NOTE — PROGRESS NOTES
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 He is also on Tikosyn for atrial fibrillation, and Eliquis for anticoagulation.    Device is working normally. 7 mode switching episodes (1.4 minutes total), <1% of total time; he is anticoagulated.  Normal sensing and capture of RA and RV leads; stable impedances. Battery longevity is 8.5 years.  No changes are made today.    FU in 3 months for next PM check with me.    He will be having prostate surgery next month, and he can stop his Eliquis for 5 days prior to surgery. He will resume it after the procedure. Written instructions are provided for the patient and MD.    Collaborating MD: SHERYL Maloney

## 2018-04-25 NOTE — LETTER
2018      RE:  Steven Bianchi ( [birth year])   [facility] [address]        To Whom It May Concern:    Steven is able to stop his Eliquis five (5) prior to prostate surgery. He should restart it after the procedure.    If you have any questions or concerns, please don't hesitate to call.        Sincerely,        WILLIE Tompkins.    Electronically Signed

## 2018-10-09 ENCOUNTER — TELEPHONE (OUTPATIENT)
Dept: VASCULAR LAB | Facility: MEDICAL CENTER | Age: 78
End: 2018-10-09

## 2018-10-09 DIAGNOSIS — I48.0 PAROXYSMAL ATRIAL FIBRILLATION (HCC): ICD-10-CM

## 2018-10-09 DIAGNOSIS — Z79.01 CHRONIC ANTICOAGULATION: Chronic | ICD-10-CM

## 2021-10-04 ENCOUNTER — TELEPHONE (OUTPATIENT)
Dept: HEALTH INFORMATION MANAGEMENT | Facility: OTHER | Age: 81
End: 2021-10-04

## 2021-10-04 NOTE — TELEPHONE ENCOUNTER
Outcome: pt declined scheduling annual [facility] pcp   Please transfer to Patient Outreach Team at [phone] when patient returns call.        Attempt # 1

## 2022-05-12 ENCOUNTER — OFFICE VISIT (OUTPATIENT)
Dept: NEUROLOGY | Facility: MEDICAL CENTER | Age: 82
End: 2022-05-12
Attending: PSYCHIATRY & NEUROLOGY
Payer: MEDICARE

## 2022-05-12 VITALS
HEIGHT: 75 IN | HEART RATE: 72 BPM | DIASTOLIC BLOOD PRESSURE: 60 MMHG | OXYGEN SATURATION: 96 % | WEIGHT: 231.26 LBS | SYSTOLIC BLOOD PRESSURE: 104 MMHG | RESPIRATION RATE: 16 BRPM | BODY MASS INDEX: 28.75 KG/M2 | TEMPERATURE: 98 F

## 2022-05-12 DIAGNOSIS — G20.A1 PARKINSON DISEASE (HCC): ICD-10-CM

## 2022-05-12 PROCEDURE — 99204 OFFICE O/P NEW MOD 45 MIN: CPT | Performed by: PSYCHIATRY & NEUROLOGY

## 2022-05-12 PROCEDURE — 99212 OFFICE O/P EST SF 10 MIN: CPT | Performed by: PSYCHIATRY & NEUROLOGY

## 2022-05-12 ASSESSMENT — FIBROSIS 4 INDEX: FIB4 SCORE: 2.53

## 2022-05-12 ASSESSMENT — PATIENT HEALTH QUESTIONNAIRE - PHQ9: CLINICAL INTERPRETATION OF PHQ2 SCORE: 0

## 2022-05-12 NOTE — PROGRESS NOTES
Chief Complaint   Patient presents with   • New Patient     Unspecified involuntary movement       History of present illness:  Steven Bianchi 81 y.o. male with Parkinson's disease, on sinemet.   His symptoms are tremors and gait imbalance/falls. He has to concentrate to walk straight. He has to walk slow and direct. He has trouble running. His gait has improved since he was placed on sinemet. He has had marked improvement in his tremor with PD.     Movement-Specific ROS  Sleep: No problems.    Swallowing: No problems.   Constipation: Comes and goes, uses a laxative or stool softener.    Dizziness: No, but can be on the verge of passing out if he out gardening.    Hallucinations: No   Cognition: His concentration is improved on sinemet.   Balance: Yes, can fall over if he is not paying attention.        Past medical history:   Past Medical History:   Diagnosis Date   • Abnormal cardiac function test 08/2017    MPI with moderate fixed defect inferior wall, LVEF 44%.   • Adverse effect of anesthesia     pt needs spinal only with less sedation than general per his wife   • Back pain    • BPH (benign prostatic hyperplasia)     Followed by urology ([place])   • Cardiac pacemaker in situ - [employer] 2018     Dual chamber, [place] [employer] model, placed by Dr. Simpson on 3/13/18 for AF with SVR & pauses   • Chronic anticoagulation    • Chronic atrial fibrillation (HCC) 07/2017    Echocardiogram with normal LV size, mild septal hypertrophy, LVEF 50%. Inferior/apical hypokinesis. Moderately dilated RV. Enlarged RA and LA. Mild MR, mild TR. RVSP 20mmHg. Ascending aorta 3.8cm   • Chronic kidney disease     Followed by nephrology ([place])   • Hematuria    • History of MI (myocardial infarction) - inferior    • Hyperlipidemia    • Hypertension    • Injury 03/25/2021    Controlled fall in Imaging    • Knee pain    • Pacemaker    • Visit for monitoring Tikosyn therapy        Past surgical history:   Past Surgical History:    Procedure Laterality Date   • PB TOTAL KNEE ARTHROPLASTY Left 4/6/2021    Procedure: LEFT TOTAL KNEE ARTHROPLASTY;  Surgeon: Leon Shah M.D.;  Location: SURGERY [facility];  Service: Orthopedics   • PACEMAKER INSERTION  03/13/2018   • PROSTATECTOMY, RADIAL  2005   • HEMORRHOIDECTOMY  1982   • OTHER ORTHOPEDIC SURGERY Right     total knee       Family history:   Family History   Problem Relation Age of Onset   • Cancer Mother    • Heart Disease Father    • Cancer Sister    • Cancer Brother        Social history:   Social History     Socioeconomic History   • Marital status:      Spouse name: Not on file   • Number of children: Not on file   • Years of education: Not on file   • Highest education level: Not on file   Occupational History   • Not on file   Tobacco Use   • Smoking status: Never Smoker   • Smokeless tobacco: Never Used   Vaping Use   • Vaping Use: Never used   Substance and Sexual Activity   • Alcohol use: Not Currently     Alcohol/week: 1.0 oz     Types: 2 Standard drinks or equivalent per week   • Drug use: No   • Sexual activity: Not on file   Other Topics Concern   • Not on file   Social History Narrative   • Not on file     Social Determinants of Health     Financial Resource Strain: Not on file   Food Insecurity: Not on file   Transportation Needs: Not on file   Physical Activity: Not on file   Stress: Not on file   Social Connections: Not on file   Intimate Partner Violence: Not on file   Housing Stability: Not on file       Current medications:   Current Outpatient Medications   Medication   • carbidopa-levodopa (SINEMET)  MG Tab   • doxazosin (CARDURA) 4 MG Tab   • aspirin (ASA) 325 MG Tab   • levothyroxine (SYNTHROID) 75 MCG Tab   • Zinc 50 MG Cap   • magnesium oxide (MAG-OX) 400 MG Tab tablet   • amiodarone (CORDARONE) 200 MG Tab   • lisinopril (PRINIVIL) 20 MG Tab   • phosphorus (K-PHOS-NEUTRAL, PHOSPHA 250 NEUTRAL) 155-852-130 MG tablet   • amLODIPine (NORVASC) 2.5  "MG Tab   • potassium chloride ER (KLOR-CON) 10 MEQ tablet     No current facility-administered medications for this visit.       Medication Allergy:  No Known Allergies    Physical examination:   Vitals:    05/12/22 1342 05/12/22 1345   BP: 118/64 104/60   BP Location: Left arm Left arm   Patient Position: Sitting Standing   BP Cuff Size: Adult Adult   Pulse: 83 72   Resp: 16    Temp: 36.7 °C (98 °F)    TempSrc: Temporal    SpO2: 97% 96%   Weight: 105 kg (231 lb 4.2 oz)    Height: 1.905 m (6' 3\")      Neurological Exam  Mental Status  Awake and alert. Speech is normal. Language is fluent with no aphasia.    Cranial Nerves  CN III, IV, VI: Extraocular movements intact bilaterally. No nystagmus. Normal smooth pursuit.    Motor   Normal muscle tone. The following abnormal movements were seen: Intermittent resting tremor of the upper extremities.        Gait    He has a postural abnormality with forward flexion of his legs.  He also has the knock kneed gait.   Moderate gait bradykinesia. .      Labs:  I reviewed the following labs personally:  None     Imaging:   None     ASSESSMENT AND PLAN:  Problem List Items Addressed This Visit    None     Visit Diagnoses     Parkinson disease (HCC)        Relevant Medications    carbidopa-levodopa (SINEMET)  MG Tab    Other Relevant Orders    Referral to Physical Therapy          1. Parkinson disease (HCC)  - Referral to Physical Therapy  - carbidopa-levodopa (SINEMET)  MG Tab; Take 2 Tablets by mouth in the morning and 2 Tablets at noon and 2 Tablets in the evening.  Dispense: 540 Tablet; Refill: 2    81-year-old male with Parkinson's disease, complicated by postural gait dysfunction.  I have increased his carbidopa levodopa to attempt to further improve his bradykinetic gait.  There is a significant orthopedic abnormality of his gait with postural dysfunction and skeletal abnormalities of his lower extremities.  Referred him to physical therapy at [facility] " [facility].    FOLLOW-UP:   Return in about 4 months (around 9/12/2022).    Total time spent for the day for this patient unrelated to procedure time is:  36 minutes. I spent 25 minutes in face to face time and I spent 1 minutes pre-charting and 10 minutes in post-visit documentation.      Dr. Gagandeep Gar D.O.  [facility]

## 2022-05-12 NOTE — PATIENT INSTRUCTIONS
Stop propranolol.     Increase carbidopa/levodopa as tolerated:     Week 1: 1.5/1.5/1  Week 2: 1.5/1.5/1.5  Week 3: 2/1.5/1.5  Week 4: 2/2/2

## 2022-09-13 ENCOUNTER — OFFICE VISIT (OUTPATIENT)
Dept: NEUROLOGY | Facility: MEDICAL CENTER | Age: 82
End: 2022-09-13
Attending: PSYCHIATRY & NEUROLOGY
Payer: MEDICARE

## 2022-09-13 VITALS
BODY MASS INDEX: 27.6 KG/M2 | WEIGHT: 222 LBS | DIASTOLIC BLOOD PRESSURE: 92 MMHG | HEART RATE: 70 BPM | TEMPERATURE: 97.8 F | SYSTOLIC BLOOD PRESSURE: 126 MMHG | OXYGEN SATURATION: 97 % | HEIGHT: 75 IN

## 2022-09-13 DIAGNOSIS — G20.A1 PARKINSON DISEASE (HCC): ICD-10-CM

## 2022-09-13 PROCEDURE — 99212 OFFICE O/P EST SF 10 MIN: CPT | Performed by: PSYCHIATRY & NEUROLOGY

## 2022-09-13 PROCEDURE — 99211 OFF/OP EST MAY X REQ PHY/QHP: CPT | Performed by: PSYCHIATRY & NEUROLOGY

## 2022-09-13 PROCEDURE — 99213 OFFICE O/P EST LOW 20 MIN: CPT | Performed by: PSYCHIATRY & NEUROLOGY

## 2022-09-13 ASSESSMENT — FIBROSIS 4 INDEX: FIB4 SCORE: 2.56

## 2022-09-13 NOTE — PROGRESS NOTES
Chief Complaint   Patient presents with    Follow-Up     Movement disorder       History of present illness:  Steven Bianchi 82 y.o. male with Parkinson's disease, on sinemet.   His symptoms are tremors and gait imbalance/falls. He has to concentrate to walk straight. He has to walk slow and direct. He has trouble running. His gait has improved since he was placed on sinemet. He has had marked improvement in his tremor with PD.     PD Meds:   Carb/levo  2 tabs 3 times daily      Movement-Specific ROS  Sleep: No problems.    Swallowing: No problems.   Constipation: Comes and goes, uses a laxative or stool softener.    Dizziness: No, but can be on the verge of passing out if he out gardening.    Hallucinations: No   Cognition: His concentration is improved on sinemet.   Balance: Yes, can fall over if he is not paying attention.      5/12/22: I increased his l-dopa to further improve his bradykinetic gait.     9/13/22: He thinks that the increased L-dopa dose has helped. He is asking for disabled placard renewal.     Past medical history:   Past Medical History:   Diagnosis Date    Abnormal cardiac function test 08/2017    MPI with moderate fixed defect inferior wall, LVEF 44%.    Adverse effect of anesthesia     pt needs spinal only with less sedation than general per his wife    Back pain     BPH (benign prostatic hyperplasia)     Followed by urology ([place])    Cardiac pacemaker in situ - [employer] 2018     Dual chamber, [employer] model, placed by Dr. Simpson on 3/13/18 for AF with SVR & pauses    Chronic anticoagulation     Chronic atrial fibrillation (HCC) 07/2017    Echocardiogram with normal LV size, mild septal hypertrophy, LVEF 50%. Inferior/apical hypokinesis. Moderately dilated RV. Enlarged RA and LA. Mild MR, mild TR. RVSP 20mmHg. Ascending aorta 3.8cm    Chronic kidney disease     Followed by nephrology ([place])    Hematuria     History of MI (myocardial infarction) - inferior      Hyperlipidemia     Hypertension     Injury 03/25/2021    Controlled fall in Imaging     Knee pain     Pacemaker     Visit for monitoring Tikosyn therapy        Past surgical history:   Past Surgical History:   Procedure Laterality Date    PB TOTAL KNEE ARTHROPLASTY Left 4/6/2021    Procedure: LEFT TOTAL KNEE ARTHROPLASTY;  Surgeon: Leon Shah M.D.;  Location: SURGERY [facility];  Service: Orthopedics    PACEMAKER INSERTION  03/13/2018    PROSTATECTOMY, RADIAL  2005    HEMORRHOIDECTOMY  1982    OTHER ORTHOPEDIC SURGERY Right     total knee       Family history:   Family History   Problem Relation Age of Onset    Cancer Mother     Heart Disease Father     Cancer Sister     Cancer Brother        Social history:   Social History     Socioeconomic History    Marital status:      Spouse name: Not on file    Number of children: Not on file    Years of education: Not on file    Highest education level: Not on file   Occupational History    Not on file   Tobacco Use    Smoking status: Never    Smokeless tobacco: Never   Vaping Use    Vaping Use: Never used   Substance and Sexual Activity    Alcohol use: Not Currently     Alcohol/week: 1.0 oz     Types: 2 Standard drinks or equivalent per week    Drug use: No    Sexual activity: Not on file   Other Topics Concern    Not on file   Social History Narrative    Not on file     Social Determinants of Health     Financial Resource Strain: Not on file   Food Insecurity: Not on file   Transportation Needs: Not on file   Physical Activity: Not on file   Stress: Not on file   Social Connections: Not on file   Intimate Partner Violence: Not on file   Housing Stability: Not on file       Current medications:   Current Outpatient Medications   Medication    levothyroxine (EUTHYROX) 75 MCG Tab    doxazosin (CARDURA) 4 MG Tab    carbidopa-levodopa (SINEMET)  MG Tab    aspirin (ASA) 325 MG Tab    Zinc 50 MG Cap    magnesium oxide (MAG-OX) 400 MG Tab tablet     "amiodarone (CORDARONE) 200 MG Tab    lisinopril (PRINIVIL) 20 MG Tab    phosphorus (K-PHOS-NEUTRAL, PHOSPHA 250 NEUTRAL) 155-852-130 MG tablet    amLODIPine (NORVASC) 2.5 MG Tab    potassium chloride ER (KLOR-CON) 10 MEQ tablet     No current facility-administered medications for this visit.       Medication Allergy:  No Known Allergies    Physical examination:   Vitals:    09/13/22 1324   BP: (!) 126/92   BP Location: Right arm   Patient Position: Sitting   BP Cuff Size: Adult   Pulse: 70   Temp: 36.6 °C (97.8 °F)   TempSrc: Temporal   SpO2: 97%   Weight: 101 kg (222 lb 0.1 oz)   Height: 1.905 m (6' 3\")     Neurological Exam    Motor   Normal muscle tone. The following abnormal movements were seen: Right arm rest tremor     Bilateral upper extremity action tremor with finger to nose .      Gait    Needs cane to ambulate     He has gait imbalance     Mildly bradykinetic gait     Right arm tremor present at rest when he is walking   .     Labs:  I reviewed the following labs personally:  None    Imaging:   None     ASSESSMENT AND PLAN:  Problem List Items Addressed This Visit    None  Visit Diagnoses       Parkinson disease (HCC)                1. Parkinson disease (HCC)    82-year-old male with Parkinson's disease, complicated by residual gait and balance dysfunction.  His carbidopa levodopa has been helpful for the tremor but has not resolved his gait and balance issues.  Overall, he has mild tremors, both at rest and with action, and has mildly bradykinetic gait and postural imbalance.  No changes were made to his Parkinson's medication and he was maintained on a dosage of 200 mg of L-dopa 3 times daily.  Today, I signed a disability placard form for him.    FOLLOW-UP:   Return in about 6 months (around 3/13/2023).    Total time spent for the day for this patient unrelated to procedure time is: 22 minutes. I spent 14 minutes in face to face time and I spent 2 minutes pre-charting and 6 minutes in post-visit " documentation.      Dr. Gagandeep Gar D.O.  [facility]

## 2023-03-10 ENCOUNTER — OFFICE VISIT (OUTPATIENT)
Dept: NEUROLOGY | Facility: MEDICAL CENTER | Age: 83
End: 2023-03-10
Attending: PSYCHIATRY & NEUROLOGY
Payer: MEDICARE

## 2023-03-10 VITALS
WEIGHT: 215.83 LBS | HEIGHT: 75 IN | DIASTOLIC BLOOD PRESSURE: 58 MMHG | TEMPERATURE: 97.4 F | RESPIRATION RATE: 16 BRPM | HEART RATE: 69 BPM | SYSTOLIC BLOOD PRESSURE: 110 MMHG | BODY MASS INDEX: 26.84 KG/M2 | OXYGEN SATURATION: 96 %

## 2023-03-10 DIAGNOSIS — G20.A1 PARKINSON DISEASE (HCC): ICD-10-CM

## 2023-03-10 DIAGNOSIS — K59.04 CHRONIC IDIOPATHIC CONSTIPATION: ICD-10-CM

## 2023-03-10 PROCEDURE — 99212 OFFICE O/P EST SF 10 MIN: CPT | Performed by: PSYCHIATRY & NEUROLOGY

## 2023-03-10 PROCEDURE — 99214 OFFICE O/P EST MOD 30 MIN: CPT | Performed by: PSYCHIATRY & NEUROLOGY

## 2023-03-10 RX ORDER — ENTACAPONE 200 MG/1
200 TABLET ORAL 3 TIMES DAILY
Qty: 90 TABLET | Refills: 2 | Status: SHIPPED | OUTPATIENT
Start: 2023-03-10 | End: 2023-06-13

## 2023-03-10 ASSESSMENT — FIBROSIS 4 INDEX: FIB4 SCORE: 2.62

## 2023-03-10 NOTE — PROGRESS NOTES
Chief Complaint   Patient presents with    Follow-Up     Parkinson disease       History of present illness:  Steven Bianchi 82 y.o. male with Parkinson's disease, on sinemet.   His symptoms are tremors and gait imbalance/falls. He has to concentrate to walk straight. He has to walk slow and direct. He has trouble running. His gait has improved since he was placed on sinemet. He has had marked improvement in his tremor with PD.      PD Meds:   Carb/levo  2 tabs 3 times daily at 8am/2pm/8pm       Movement-Specific ROS  Sleep: No problems.    Swallowing: No problems.   Constipation: Comes and goes, uses a laxative or stool softener.    Dizziness: No, but can be on the verge of passing out if he out gardening.    Hallucinations: No   Cognition: His concentration is improved on sinemet.   Balance: Yes, can fall over if he is not paying attention.       5/12/22: I increased his l-dopa to further improve his bradykinetic gait.      9/13/22: He thinks that the increased L-dopa dose has helped. He is asking for disabled placard renewal.    His carbidopa levodopa has been helpful for the tremor but has not resolved his gait and balance issues.  Overall, he has mild tremors, both at rest and with action, and has mildly bradykinetic gait and postural imbalance.  No changes were made to his Parkinson's medication and he was maintained on a dosage of 200 mg of L-dopa 3 times daily.  Today, I signed a disability placard form for him.    3/10/23: He feels that he is not walking as well and is having a harder time with his mobility. His left foot drags more. His main problem is balance and tremor.     Past medical history:   Past Medical History:   Diagnosis Date    Abnormal cardiac function test 08/2017    MPI with moderate fixed defect inferior wall, LVEF 44%.    Adverse effect of anesthesia     pt needs spinal only with less sedation than general per his wife    Back pain     BPH (benign prostatic hyperplasia)      Followed by urology ([place])    Cardiac pacemaker in situ - [employer] 2018     Dual chamber, [employer] model, placed by Dr. Simpson on 3/13/18 for AF with SVR & pauses    Chronic anticoagulation     Chronic atrial fibrillation (HCC) 07/2017    Echocardiogram with normal LV size, mild septal hypertrophy, LVEF 50%. Inferior/apical hypokinesis. Moderately dilated RV. Enlarged RA and LA. Mild MR, mild TR. RVSP 20mmHg. Ascending aorta 3.8cm    Chronic kidney disease     Followed by nephrology ([place])    Hematuria     History of MI (myocardial infarction) - inferior     Hyperlipidemia     Hypertension     Injury 03/25/2021    Controlled fall in Imaging     Knee pain     Pacemaker     Visit for monitoring Tikosyn therapy        Past surgical history:   Past Surgical History:   Procedure Laterality Date    PB TOTAL KNEE ARTHROPLASTY Left 4/6/2021    Procedure: LEFT TOTAL KNEE ARTHROPLASTY;  Surgeon: Leon Shah M.D.;  Location: SURGERY [facility];  Service: Orthopedics    PACEMAKER INSERTION  03/13/2018    PROSTATECTOMY, RADIAL  2005    HEMORRHOIDECTOMY  1982    OTHER ORTHOPEDIC SURGERY Right     total knee       Family history:   Family History   Problem Relation Age of Onset    Cancer Mother     Heart Disease Father     Cancer Sister     Cancer Brother        Social history:   Social History     Socioeconomic History    Marital status:      Spouse name: Not on file    Number of children: Not on file    Years of education: Not on file    Highest education level: Not on file   Occupational History    Not on file   Tobacco Use    Smoking status: Never    Smokeless tobacco: Never   Vaping Use    Vaping Use: Never used   Substance and Sexual Activity    Alcohol use: Not Currently     Alcohol/week: 1.0 oz     Types: 2 Standard drinks or equivalent per week    Drug use: No    Sexual activity: Not on file   Other Topics Concern    Not on file   Social History Narrative    Not on file     Social  "Determinants of Health     Financial Resource Strain: Not on file   Food Insecurity: Not on file   Transportation Needs: Not on file   Physical Activity: Not on file   Stress: Not on file   Social Connections: Not on file   Intimate Partner Violence: Not on file   Housing Stability: Not on file       Current medications:   Current Outpatient Medications   Medication    entacapone (COMTAN) 200 MG Tab    levothyroxine (SYNTHROID) 50 MCG Tab    furosemide (LASIX) 40 MG Tab    doxazosin (CARDURA) 4 MG Tab    finasteride (PROSCAR) 5 MG Tab    carbidopa-levodopa (SINEMET)  MG Tab    aspirin (ASA) 325 MG Tab    Zinc 50 MG Cap    magnesium oxide (MAG-OX) 400 MG Tab tablet    amiodarone (CORDARONE) 200 MG Tab    lisinopril (PRINIVIL) 20 MG Tab    phosphorus (K-PHOS-NEUTRAL, PHOSPHA 250 NEUTRAL) 155-852-130 MG tablet    amLODIPine (NORVASC) 2.5 MG Tab    potassium chloride ER (KLOR-CON) 10 MEQ tablet     No current facility-administered medications for this visit.       Medication Allergy:  No Known Allergies    Physical examination:   Vitals:    03/10/23 1256 03/10/23 1301   BP: 118/64 110/58   BP Location: Left arm Left arm   Patient Position: Sitting Standing   BP Cuff Size: Adult Adult   Pulse: 85 69   Resp: 16    Temp: 36.3 °C (97.4 °F)    TempSrc: Temporal    SpO2: 96% 96%   Weight: 97.9 kg (215 lb 13.3 oz)    Height: 1.905 m (6' 3\")      Neurological Exam  Mental Status  Awake and alert. Mild dysarthria present. Language is fluent with no aphasia.    Motor   The following abnormal movements were seen: R arm rest tremor is frequent.      Gait    Moderate bradykinesia     Unsteady gait .     Labs:  I reviewed the following labs personally:  None     Imaging:   None     ASSESSMENT AND PLAN:  Problem List Items Addressed This Visit       Chronic idiopathic constipation    Parkinson disease (HCC)    Relevant Medications    entacapone (COMTAN) 200 MG Tab       1. Parkinson disease (HCC)  - entacapone (COMTAN) 200 MG " Tab; Take 1 Tablet by mouth 3 times a day.  Dispense: 90 Tablet; Refill: 2    2. Chronic idiopathic constipation    He is having moderate resting tremor and bradykinesia. Today's exam is consistent with a Parkinsonian state. He would benefit from an increase in his PD medications. I have added entacapone today, 200mg 3x/day.     I have recommended that he add probiotics/prebiotic fiber for treatment of chronic constipation.     FOLLOW-UP:   Return in about 6 months (around 9/10/2023).    Dr. Gagandeep Gar D.O.  [facility]

## 2023-03-10 NOTE — PATIENT INSTRUCTIONS
Start entacapone 200mg (1 tab) 3 times daily with each dose of carbidopa/levodopa     No change with the carbidopa/levodopa dose.     Start daily probiotic with prebiotic fiber supplement for constipation

## 2023-08-28 ENCOUNTER — TELEPHONE (OUTPATIENT)
Dept: NEUROLOGY | Facility: MEDICAL CENTER | Age: 83
End: 2023-08-28
Payer: MEDICARE

## 2023-08-28 NOTE — TELEPHONE ENCOUNTER

## 2023-09-05 ENCOUNTER — OFFICE VISIT (OUTPATIENT)
Dept: NEUROLOGY | Facility: MEDICAL CENTER | Age: 83
End: 2023-09-05
Attending: PSYCHIATRY & NEUROLOGY
Payer: MEDICARE

## 2023-09-05 VITALS
RESPIRATION RATE: 14 BRPM | DIASTOLIC BLOOD PRESSURE: 62 MMHG | HEIGHT: 75 IN | TEMPERATURE: 97.3 F | HEART RATE: 71 BPM | OXYGEN SATURATION: 97 % | BODY MASS INDEX: 23.82 KG/M2 | WEIGHT: 191.58 LBS | SYSTOLIC BLOOD PRESSURE: 90 MMHG

## 2023-09-05 DIAGNOSIS — G20.A1 PARKINSON DISEASE (HCC): ICD-10-CM

## 2023-09-05 PROCEDURE — 3074F SYST BP LT 130 MM HG: CPT | Performed by: PSYCHIATRY & NEUROLOGY

## 2023-09-05 PROCEDURE — 3078F DIAST BP <80 MM HG: CPT | Performed by: PSYCHIATRY & NEUROLOGY

## 2023-09-05 PROCEDURE — 99212 OFFICE O/P EST SF 10 MIN: CPT | Performed by: PSYCHIATRY & NEUROLOGY

## 2023-09-05 PROCEDURE — 99215 OFFICE O/P EST HI 40 MIN: CPT | Performed by: PSYCHIATRY & NEUROLOGY

## 2023-09-05 ASSESSMENT — FIBROSIS 4 INDEX: FIB4 SCORE: 2.63

## 2023-09-05 NOTE — PATIENT INSTRUCTIONS
Lower the dose of your carbidopa/levodopa from 3 tabs to 2 tabs 3 times daily   Continue the entacapone at the current dose of 1 tab 3 times daily  I have placed a referral to physical/speech therapy at [place]

## 2023-09-05 NOTE — PROGRESS NOTES
Chief Complaint   Patient presents with    Follow-Up     Parkinson disease       History of present illness:  Steven Bianchi 82 y.o. male with  Parkinson's disease, on sinemet.   His symptoms are tremors and gait imbalance/falls. He has to concentrate to walk straight. He has to walk slow and direct. He has trouble running. His gait has improved since he was placed on sinemet. He has had marked improvement in his tremor with PD.      PD Meds:   Carb/levo  3 tabs 3 times daily at 8am/2pm/8pm  Entacapone 200mg 3 times daily      Movement-Specific ROS  Sleep: No problems.    Swallowing: No problems.   Constipation: Comes and goes, uses a laxative or stool softener.    Dizziness: No, but can be on the verge of passing out if he out gardening. He might get dizzy after eating a meal and he has to sit/lie down.    Hallucinations: He sees purple when he gets dizzy   Cognition: His concentration is improved on sinemet.   Balance: Yes, can fall over if he is not paying attention.       5/12/22: I increased his l-dopa to further improve his bradykinetic gait.      9/13/22: He thinks that the increased L-dopa dose has helped. He is asking for disabled placard renewal.    His carbidopa levodopa has been helpful for the tremor but has not resolved his gait and balance issues.  Overall, he has mild tremors, both at rest and with action, and has mildly bradykinetic gait and postural imbalance.  No changes were made to his Parkinson's medication and he was maintained on a dosage of 200 mg of L-dopa 3 times daily.  Today, I signed a disability placard form for him.     3/10/23: He feels that he is not walking as well and is having a harder time with his mobility. His left foot drags more. His main problem is balance and tremor.    He is having moderate resting tremor and bradykinesia. Today's exam is consistent with a Parkinsonian state. He would benefit from an increase in his PD medications. I have added entacapone today,  200mg 3x/day.      I have recommended that he add probiotics/prebiotic fiber for treatment of chronic constipation.     9/5/23: He has been having dizziness as well as seeing purple spots in his vision when he is dizzy.  He is also dizzy after meals, forcing him to lie down.    Past medical history:   Past Medical History:   Diagnosis Date    Abnormal cardiac function test 08/2017    MPI with moderate fixed defect inferior wall, LVEF 44%.    Adverse effect of anesthesia     pt needs spinal only with less sedation than general per his wife    Back pain     BPH (benign prostatic hyperplasia)     Followed by urology ([place])    Cardiac pacemaker in situ - [employer] 2018     Dual chamber, [employer] model, placed by Dr. Simpson on 3/13/18 for AF with SVR & pauses    Chronic anticoagulation     Chronic atrial fibrillation (HCC) 07/2017    Echocardiogram with normal LV size, mild septal hypertrophy, LVEF 50%. Inferior/apical hypokinesis. Moderately dilated RV. Enlarged RA and LA. Mild MR, mild TR. RVSP 20mmHg. Ascending aorta 3.8cm    Chronic kidney disease     Followed by nephrology ([place])    Hematuria     History of MI (myocardial infarction) - inferior     Hyperlipidemia     Hypertension     Injury 03/25/2021    Controlled fall in Imaging     Knee pain     Pacemaker     Visit for monitoring Tikosyn therapy        Past surgical history:   Past Surgical History:   Procedure Laterality Date    PB TOTAL KNEE ARTHROPLASTY Left 4/6/2021    Procedure: LEFT TOTAL KNEE ARTHROPLASTY;  Surgeon: Leon Shah M.D.;  Location: SURGERY [facility];  Service: Orthopedics    PACEMAKER INSERTION  03/13/2018    PROSTATECTOMY, RADIAL  2005    HEMORRHOIDECTOMY  1982    OTHER ORTHOPEDIC SURGERY Right     total knee       Family history:   Family History   Problem Relation Age of Onset    Cancer Mother     Heart Disease Father     Cancer Sister     Cancer Brother        Social history:   Social History     Socioeconomic  "History    Marital status:      Spouse name: Not on file    Number of children: Not on file    Years of education: Not on file    Highest education level: Not on file   Occupational History    Not on file   Tobacco Use    Smoking status: Never    Smokeless tobacco: Never   Vaping Use    Vaping Use: Never used   Substance and Sexual Activity    Alcohol use: Not Currently     Alcohol/week: 1.0 oz     Types: 2 Standard drinks or equivalent per week    Drug use: No    Sexual activity: Not on file   Other Topics Concern    Not on file   Social History Narrative    Not on file     Social Determinants of Health     Financial Resource Strain: Not on file   Food Insecurity: Not on file   Transportation Needs: Not on file   Physical Activity: Not on file   Stress: Not on file   Social Connections: Not on file   Intimate Partner Violence: Not on file   Housing Stability: Not on file       Current medications:   Current Outpatient Medications   Medication    finasteride (PROSCAR) 5 MG Tab    entacapone (COMTAN) 200 MG Tab    doxazosin (CARDURA) 4 MG Tab    levothyroxine (SYNTHROID) 50 MCG Tab    carbidopa-levodopa (SINEMET)  MG Tab    furosemide (LASIX) 40 MG Tab    aspirin (ASA) 325 MG Tab    magnesium oxide (MAG-OX) 400 MG Tab tablet    amiodarone (CORDARONE) 200 MG Tab    lisinopril (PRINIVIL) 20 MG Tab    phosphorus (K-PHOS-NEUTRAL, PHOSPHA 250 NEUTRAL) 155-852-130 MG tablet    potassium chloride ER (KLOR-CON) 10 MEQ tablet     No current facility-administered medications for this visit.       Medication Allergy:  No Known Allergies    Physical examination:   Vitals:    09/05/23 1250 09/05/23 1252   BP: 110/64 90/62   BP Location: Left arm Left arm   Patient Position: Sitting Standing   BP Cuff Size: Adult Adult   Pulse: 71 71   Resp: 14    Temp: 36.3 °C (97.3 °F)    TempSrc: Temporal    SpO2: 96% 97%   Weight: 86.9 kg (191 lb 9.3 oz)    Height: 1.905 m (6' 3\")      Neurological Exam    Motor   Normal muscle " tone. The following abnormal movements were seen: Mild intermittent left arm rest tremor.      Gait    Buckling of both knees   He has an unsteady gait and there are skeletal abnormalities  Mildly bradykinetic .       Labs:  I reviewed the following labs personally:  None    Imaging:   None     ASSESSMENT AND PLAN:  Problem List Items Addressed This Visit       Parkinson disease (HCC)    Relevant Orders    Referral to Physical Therapy    Referral to Speech Therapy       1. Parkinson disease (HCC)  - Referral to Physical Therapy  - Referral to Speech Therapy    82-year-old male with Parkinson's disease, complicated by skeletal abnormalities and gait imbalance.  He is also having orthostatic hypotension, evidenced today by a drop of 110 systolic to 90 systolic.  He is seeing purple spots when he is dizzy.  His examination today reveals moderate gait difficulties, including instability of gait as well as skeletal abnormalities of his lower extremities.  Today, I instructed him to lower the carbidopa/levodopa to 200 mg, down from 300 mg 3 times daily to reduce orthostatic hypotension, and have provided him with instructions to increase his fluid intake. He has been referred to both physical and speech therapy at [place].    FOLLOW-UP:   Return in about 6 months (around 3/5/2024).    Total time spent for the day for this patient unrelated to procedure time is: 40 minutes. I spent 30 minutes in face to face time and I spent 5 minutes pre-charting and 5 minutes in post-visit documentation.      Dr. Gagandeep Gar D.O.  [facility]

## 2024-01-14 DIAGNOSIS — G20.A1 PARKINSON DISEASE (HCC): ICD-10-CM

## 2024-01-15 NOTE — TELEPHONE ENCOUNTER
Received request via: Pharmacy    Medication Name/Dosage Carbidopa Levodopa  MG    When was medication last prescribed 3.29.2023    How many refills were previously provided 2    How many Refills does he patient have left from last prescription 0    Was the patient seen in the last year in this department? Yes   Date of last office visit 9.5.2023     Per last Neurology Office Visit, when was the date of next follow up visit set for?                            Date of office visit follow up request 3.5.2024     Does the patient have an upcoming appointment? Yes   If yes, when 3.5.2024             If no, schedule appointment Scheduled    Does the patient have shelter Plus and need 100 day supply (blood pressure, diabetes and cholesterol meds only)? Medication is not for cholesterol, blood pressure or diabetes

## 2024-03-02 DIAGNOSIS — G20.A1 PARKINSON DISEASE (HCC): ICD-10-CM

## 2024-03-05 ENCOUNTER — OFFICE VISIT (OUTPATIENT)
Dept: NEUROLOGY | Facility: MEDICAL CENTER | Age: 84
End: 2024-03-05
Attending: PSYCHIATRY & NEUROLOGY
Payer: MEDICARE

## 2024-03-05 VITALS
BODY MASS INDEX: 23.03 KG/M2 | WEIGHT: 185.19 LBS | OXYGEN SATURATION: 96 % | DIASTOLIC BLOOD PRESSURE: 80 MMHG | HEIGHT: 75 IN | TEMPERATURE: 98.1 F | SYSTOLIC BLOOD PRESSURE: 140 MMHG | HEART RATE: 72 BPM

## 2024-03-05 DIAGNOSIS — G20.A1 PARKINSON'S DISEASE WITHOUT DYSKINESIA OR FLUCTUATING MANIFESTATIONS (HCC): ICD-10-CM

## 2024-03-05 PROCEDURE — 99212 OFFICE O/P EST SF 10 MIN: CPT | Performed by: PSYCHIATRY & NEUROLOGY

## 2024-03-05 PROCEDURE — 99214 OFFICE O/P EST MOD 30 MIN: CPT | Performed by: PSYCHIATRY & NEUROLOGY

## 2024-03-05 PROCEDURE — 3079F DIAST BP 80-89 MM HG: CPT | Performed by: PSYCHIATRY & NEUROLOGY

## 2024-03-05 PROCEDURE — 3077F SYST BP >= 140 MM HG: CPT | Performed by: PSYCHIATRY & NEUROLOGY

## 2024-03-05 ASSESSMENT — FIBROSIS 4 INDEX: FIB4 SCORE: 0.98

## 2024-03-05 NOTE — PROGRESS NOTES
No chief complaint on file.      History of present illness:  Steven Bianchi 83 y.o. male with Parkinson's disease, on sinemet.   His symptoms are tremors and gait imbalance/falls. He has to concentrate to walk straight. He has to walk slow and direct. He has trouble running. His gait has improved since he was placed on sinemet. He has had marked improvement in his tremor with PD.      PD Meds:   Carb/levo  3 tabs 3 times daily at 8am/2pm/8pm  Entacapone 200mg 3 times daily      Movement-Specific ROS  Sleep: No problems.    Swallowing: No problems.   Constipation: Comes and goes, uses a laxative or stool softener.    Dizziness: No, but can be on the verge of passing out if he out gardening. He might get dizzy after eating a meal and he has to sit/lie down.    Hallucinations: He sees purple when he gets dizzy   Cognition: His concentration is improved on sinemet.   Balance: Yes, can fall over if he is not paying attention.       5/12/22: I increased his l-dopa to further improve his bradykinetic gait.      9/13/22: He thinks that the increased L-dopa dose has helped. He is asking for disabled placard renewal.    His carbidopa levodopa has been helpful for the tremor but has not resolved his gait and balance issues.  Overall, he has mild tremors, both at rest and with action, and has mildly bradykinetic gait and postural imbalance.  No changes were made to his Parkinson's medication and he was maintained on a dosage of 200 mg of L-dopa 3 times daily.  Today, I signed a disability placard form for him.     3/10/23: He feels that he is not walking as well and is having a harder time with his mobility. His left foot drags more. His main problem is balance and tremor.    He is having moderate resting tremor and bradykinesia. Today's exam is consistent with a Parkinsonian state. He would benefit from an increase in his PD medications. I have added entacapone today, 200mg 3x/day.      I have recommended that he  add probiotics/prebiotic fiber for treatment of chronic constipation.      9/5/23: He has been having dizziness as well as seeing purple spots in his vision when he is dizzy.  He is also dizzy after meals, forcing him to lie down.     3/5/24: He goes to [facility] exercise class for Parkinson.     Past medical history:   Past Medical History:   Diagnosis Date    Abnormal cardiac function test 08/2017    MPI with moderate fixed defect inferior wall, LVEF 44%.    Adverse effect of anesthesia     pt needs spinal only with less sedation than general per his wife    Back pain     BPH (benign prostatic hyperplasia)     Followed by urology ([place])    Cardiac pacemaker in situ - [employer] 2018     Dual chamber, [employer] model, placed by Dr. Simpson on 3/13/18 for AF with SVR & pauses    Chronic anticoagulation     Chronic atrial fibrillation (HCC) 07/2017    Echocardiogram with normal LV size, mild septal hypertrophy, LVEF 50%. Inferior/apical hypokinesis. Moderately dilated RV. Enlarged RA and LA. Mild MR, mild TR. RVSP 20mmHg. Ascending aorta 3.8cm    Chronic kidney disease     Followed by nephrology ([place])    Hematuria     History of MI (myocardial infarction) - inferior     Hyperlipidemia     Hypertension     Injury 03/25/2021    Controlled fall in Imaging     Knee pain     Pacemaker     Visit for monitoring Tikosyn therapy        Past surgical history:   Past Surgical History:   Procedure Laterality Date    PB TOTAL KNEE ARTHROPLASTY Left 4/6/2021    Procedure: LEFT TOTAL KNEE ARTHROPLASTY;  Surgeon: Leon Shah M.D.;  Location: SURGERY [facility];  Service: Orthopedics    PACEMAKER INSERTION  03/13/2018    PROSTATECTOMY, RADIAL  2005    HEMORRHOIDECTOMY  1982    OTHER ORTHOPEDIC SURGERY Right     total knee       Family history:   Family History   Problem Relation Age of Onset    Cancer Mother     Heart Disease Father     Cancer Sister     Cancer Brother        Social history:    Social History     Socioeconomic History    Marital status:      Spouse name: Not on file    Number of children: Not on file    Years of education: Not on file    Highest education level: Not on file   Occupational History    Not on file   Tobacco Use    Smoking status: Never    Smokeless tobacco: Never   Vaping Use    Vaping Use: Never used   Substance and Sexual Activity    Alcohol use: Not Currently     Alcohol/week: 1.0 oz     Types: 2 Standard drinks or equivalent per week    Drug use: No    Sexual activity: Not on file   Other Topics Concern    Not on file   Social History Narrative    Not on file     Social Determinants of Health     Financial Resource Strain: Not on file   Food Insecurity: Not on file   Transportation Needs: Not on file   Physical Activity: Not on file   Stress: Not on file   Social Connections: Not on file   Intimate Partner Violence: Not on file   Housing Stability: Not on file       Current medications:   Current Outpatient Medications   Medication    carbidopa-levodopa (SINEMET)  MG Tab    finasteride (PROSCAR) 5 MG Tab    mirtazapine (REMERON) 15 MG Tab    levothyroxine (SYNTHROID) 50 MCG Tab    ondansetron (ZOFRAN ODT) 4 MG TABLET DISPERSIBLE    entacapone (COMTAN) 200 MG Tab    doxazosin (CARDURA) 4 MG Tab    furosemide (LASIX) 40 MG Tab    aspirin (ASA) 325 MG Tab    magnesium oxide (MAG-OX) 400 MG Tab tablet    amiodarone (CORDARONE) 200 MG Tab    lisinopril (PRINIVIL) 20 MG Tab    phosphorus (K-PHOS-NEUTRAL, PHOSPHA 250 NEUTRAL) 155-852-130 MG tablet    potassium chloride ER (KLOR-CON) 10 MEQ tablet     No current facility-administered medications for this visit.       Medication Allergy:  No Known Allergies    Physical examination:   There were no vitals filed for this visit.  Neurological Exam  Mental Status  Awake and alert. Mild dysarthria present. Language is fluent with no aphasia.    Motor   Normal muscle tone. The following abnormal movements were seen: Left  hand intermittent rest tremor , most significant with walking.    Left foot tapping is diminished in amplitude/speed .    Gait    Knock kneed   Stooped  Can ambulate unassisted for short distances but needs walker to be safe    .       ASSESSMENT AND PLAN:  Problem List Items Addressed This Visit          Neurology Medicine Problems    Parkinson disease    Relevant Orders    Referral to Speech Therapy    Referral to Physical Therapy       1. Parkinson's disease without dyskinesia or fluctuating manifestations  - Referral to Speech Therapy  - Referral to Physical Therapy    Steven is a 83-year-old male with Parkinson's disease, with skeletal/postural abnormalities affecting his gait.  He has mild tremor his current dose of carbidopa levodopa is effective for the tremors.  He does have moderate gait impairment, which I have referred him to physical therapy for treatment of.  He also is endorsing word finding difficulties/cognitive impairment.  Speech therapy referral has been sent for this as well.  Counseled him on the nature of Parkinson's disease cognitive issues, generally being milder than that of Alzheimer's.  No changes were made in the carbidopa levodopa today.    FOLLOW-UP:   Return in about 6 months (around 9/5/2024).    Total time spent for the day for this patient unrelated to procedure time is: 35 minutes. I spent 25 minutes in face to face time and I spent 5 minutes pre-charting and 5 minutes in post-visit documentation.      CHACHO VuongO.  [facility]

## 2024-03-06 RX ORDER — ENTACAPONE 200 MG/1
200 TABLET ORAL 3 TIMES DAILY
Qty: 270 TABLET | Refills: 2 | Status: SHIPPED | OUTPATIENT
Start: 2024-03-06

## 2024-03-06 NOTE — TELEPHONE ENCOUNTER
Received request via: Pharmacy    Medication Name/Dosage Entacapone 200 MG    When was medication last prescribed 6.13.2023    How many refills were previously provided 2    How many Refills does he patient have left from last prescription 0    Was the patient seen in the last year in this department? Yes   Date of last office visit 3/5/2024     Per last Neurology Office Visit, when was the date of next follow up visit set for?         6 months                   Date of office visit follow up request 9.5.2024     Does the patient have an upcoming appointment? Yes   If yes, when 9.10.2024             If no, schedule appointment Scheduled    Does the patient have group home Plus and need 100 day supply (blood pressure, diabetes and cholesterol meds only)? Patient does not have SCP

## 2024-04-11 ENCOUNTER — TELEPHONE (OUTPATIENT)
Dept: NEUROLOGY | Facility: MEDICAL CENTER | Age: 84
End: 2024-04-11
Payer: MEDICARE

## 2024-04-11 NOTE — TELEPHONE ENCOUNTER
04/11/24  Patients wife called and was distraught over the patient having extreme hallucinating in the middle of the night. Patient is having sexual thoughts that have never been a problem as well as he is hearing voices. This only seems to be a problem in the middle of the night. Patient's wife would like to know if this could be due to the Carbidopa/Levodopa? Please advise....

## 2024-04-15 ENCOUNTER — TELEPHONE (OUTPATIENT)
Dept: NEUROLOGY | Facility: MEDICAL CENTER | Age: 84
End: 2024-04-15
Payer: MEDICARE

## 2024-04-15 DIAGNOSIS — G20.A1 PARKINSON DISEASE (HCC): ICD-10-CM

## 2024-04-15 NOTE — TELEPHONE ENCOUNTER
Received [employer] request via [employer]  for carbidopa-levodopa (SINEMET)  MG Tab . (Quantity:540, Day Supply:90)     Insurance: [employer]  Member ID:  [id number]  BIN: [id number]  PCN: [id number]  Group: [id number]     Ran Test claim via [employer]  REFILL TOO SOON UNTIL 06/07/2024. LAST FILL DT 04/08/2024 FILLED AT PHARMACY [facility]

## 2024-04-15 NOTE — TELEPHONE ENCOUNTER
Don is having nighttime hallucinations and sexual fantasies. I have counseled the wife to lower the dose of the carbidopa/levodopa to 2.5 tabs 3 times daily followed by 2 tabs 3 times daily if needed given that he is on a high dose of L-dopa.     Ling - please arrange for next available follow-up visit.     Dr. Gagandeep Gar D.O.  [facility]

## 2024-06-17 ENCOUNTER — TELEPHONE (OUTPATIENT)
Dept: PHARMACY | Facility: MEDICAL CENTER | Age: 84
End: 2024-06-17
Payer: MEDICARE

## 2024-06-17 NOTE — TELEPHONE ENCOUNTER
Spoke with Don's wife Lady and offered specialty pharmacy services-She declined at this time and will continue to fill medications with [facility].    Nasra Small  Rx Coordinator   [phone]

## 2024-07-16 ENCOUNTER — OFFICE VISIT (OUTPATIENT)
Dept: NEUROLOGY | Facility: MEDICAL CENTER | Age: 84
End: 2024-07-16
Attending: PSYCHIATRY & NEUROLOGY
Payer: MEDICARE

## 2024-07-16 VITALS
BODY MASS INDEX: 22.83 KG/M2 | HEART RATE: 70 BPM | SYSTOLIC BLOOD PRESSURE: 132 MMHG | HEIGHT: 75 IN | RESPIRATION RATE: 24 BRPM | TEMPERATURE: 97 F | WEIGHT: 183.64 LBS | DIASTOLIC BLOOD PRESSURE: 78 MMHG | OXYGEN SATURATION: 97 %

## 2024-07-16 DIAGNOSIS — G20.A1 PARKINSON DISEASE (HCC): ICD-10-CM

## 2024-07-16 PROCEDURE — 99214 OFFICE O/P EST MOD 30 MIN: CPT | Performed by: PSYCHIATRY & NEUROLOGY

## 2024-07-16 PROCEDURE — 3078F DIAST BP <80 MM HG: CPT | Performed by: PSYCHIATRY & NEUROLOGY

## 2024-07-16 PROCEDURE — 3075F SYST BP GE 130 - 139MM HG: CPT | Performed by: PSYCHIATRY & NEUROLOGY

## 2024-07-16 PROCEDURE — 99212 OFFICE O/P EST SF 10 MIN: CPT | Performed by: PSYCHIATRY & NEUROLOGY

## 2024-07-16 ASSESSMENT — FIBROSIS 4 INDEX: FIB4 SCORE: 3.29

## 2024-08-13 ENCOUNTER — TELEPHONE (OUTPATIENT)
Dept: NEUROLOGY | Facility: MEDICAL CENTER | Age: 84
End: 2024-08-13
Payer: MEDICARE

## 2024-08-13 DIAGNOSIS — G20.A1 DEMENTIA ASSOCIATED WITH PARKINSON'S DISEASE (HCC): ICD-10-CM

## 2024-08-13 DIAGNOSIS — F02.80 DEMENTIA ASSOCIATED WITH PARKINSON'S DISEASE (HCC): ICD-10-CM

## 2024-08-13 RX ORDER — DONEPEZIL HYDROCHLORIDE 5 MG/1
TABLET, FILM COATED ORAL
Qty: 210 TABLET | Refills: 0 | Status: SHIPPED | OUTPATIENT
Start: 2024-08-13 | End: 2024-12-11

## 2024-08-13 NOTE — TELEPHONE ENCOUNTER
Received a message from the patient regarding memory impairment and confusion due to medications. Patient's next appointment is on 1/16/2025.   Please advise.   Thank you!    Ingrid ROBISON

## 2024-08-13 NOTE — TELEPHONE ENCOUNTER
Patient's wife reports that he was hospitalized recently for confusion and not recognizing his wife. This occurred after lowering his carbidopa/levodopa down to 2 tabs 3 times daily. Given that the dose was recently lowered, no further dose reduction will be maed. I have written a Rx for donepezil 5mg qHS for treatment of Parkinson dementia.    Dr. Gagandeep Gar D.O.  [facility]  Movement Disorders Specialist

## 2024-08-23 ENCOUNTER — OFFICE VISIT (OUTPATIENT)
Dept: NEUROLOGY | Facility: MEDICAL CENTER | Age: 84
End: 2024-08-23
Attending: PSYCHIATRY & NEUROLOGY
Payer: MEDICARE

## 2024-08-23 VITALS
BODY MASS INDEX: 22.94 KG/M2 | HEART RATE: 70 BPM | HEIGHT: 75 IN | WEIGHT: 184.53 LBS | SYSTOLIC BLOOD PRESSURE: 108 MMHG | DIASTOLIC BLOOD PRESSURE: 74 MMHG | TEMPERATURE: 97.8 F | OXYGEN SATURATION: 96 %

## 2024-08-23 DIAGNOSIS — G20.A1 PARKINSON'S DISEASE WITHOUT DYSKINESIA OR FLUCTUATING MANIFESTATIONS (HCC): ICD-10-CM

## 2024-08-23 DIAGNOSIS — G20.A1 PSYCHOSIS DUE TO PARKINSON'S DISEASE (HCC): ICD-10-CM

## 2024-08-23 DIAGNOSIS — F06.8 PSYCHOSIS DUE TO PARKINSON'S DISEASE (HCC): ICD-10-CM

## 2024-08-23 PROCEDURE — 3078F DIAST BP <80 MM HG: CPT | Performed by: PSYCHIATRY & NEUROLOGY

## 2024-08-23 PROCEDURE — 99212 OFFICE O/P EST SF 10 MIN: CPT | Performed by: PSYCHIATRY & NEUROLOGY

## 2024-08-23 PROCEDURE — 99213 OFFICE O/P EST LOW 20 MIN: CPT | Performed by: PSYCHIATRY & NEUROLOGY

## 2024-08-23 PROCEDURE — 3074F SYST BP LT 130 MM HG: CPT | Performed by: PSYCHIATRY & NEUROLOGY

## 2024-08-23 ASSESSMENT — FIBROSIS 4 INDEX: FIB4 SCORE: 5.5

## 2024-08-23 ASSESSMENT — PATIENT HEALTH QUESTIONNAIRE - PHQ9: CLINICAL INTERPRETATION OF PHQ2 SCORE: 0

## 2024-08-23 NOTE — PATIENT INSTRUCTIONS
CONTINUE donepezil 5mg qHS for 1 month, then increase the dose to 10mg every evening until the next visit.

## 2024-08-23 NOTE — PROGRESS NOTES
Chief Complaint   Patient presents with    Follow-Up     Parkinson disease (HCC)       History of present illness:  Steven Bianchi 83 y.o. male with Parkinson's disease, on sinemet.   His symptoms are tremors and gait imbalance/falls. He has to concentrate to walk straight. He has to walk slow and direct. He has trouble running. His gait has improved since he was placed on sinemet. He has had marked improvement in his tremor with PD.      PD Meds:   Carb/levo  2 tabs 3 times daily at 8am/2pm/8pm  Entacapone 200mg 3 times daily  Donepezil 5mg qHS       Movement-Specific ROS  Sleep: No problems.    Swallowing: No problems.   Constipation: Comes and goes, uses a laxative or stool softener.    Dizziness: No, but can be on the verge of passing out if he out gardening. He might get dizzy after eating a meal and he has to sit/lie down.    Hallucinations: He was having hallucinations on 3 tabs of C/L therefore I reduced the dose to 2 tabs. He may think that his kids are in the house in the evening when they are not.  He sees spiders when they are not present.   Cognition: He is confused. He has had episodes where he has trouble completing his sentences.    Balance: Yes, can fall over if he is not paying attention. He has had 3 falls recently.     Daytime fatigue: No, can have good energy.     5/12/22: I increased his l-dopa to further improve his bradykinetic gait.      9/13/22: He thinks that the increased L-dopa dose has helped. He is asking for disabled placard renewal.    His carbidopa levodopa has been helpful for the tremor but has not resolved his gait and balance issues.  Overall, he has mild tremors, both at rest and with action, and has mildly bradykinetic gait and postural imbalance.  No changes were made to his Parkinson's medication and he was maintained on a dosage of 200 mg of L-dopa 3 times daily.  Today, I signed a disability placard form for him.     3/10/23: He feels that he is not walking as  well and is having a harder time with his mobility. His left foot drags more. His main problem is balance and tremor.    He is having moderate resting tremor and bradykinesia. Today's exam is consistent with a Parkinsonian state. He would benefit from an increase in his PD medications. I have added entacapone today, 200mg 3x/day.      I have recommended that he add probiotics/prebiotic fiber for treatment of chronic constipation.      9/5/23: He has been having dizziness as well as seeing purple spots in his vision when he is dizzy.  He is also dizzy after meals, forcing him to lie down.      3/5/24: He goes to [facility] exercise class for Parkinson.    7/16/24: His main problem is the instability when walking. This has resulted in multiple falls. Physical therapy was not felt to be helpful.   In addition, there is confusion/hallucinations that have likely been exacerbated by high doses of L-dopa. Lowering the L-dopa to 2.5 tabs (down from 3 tabs) was helpful, therefore a further dose decrease was made to 2 tabs 3 times daily given that he continues to have hallucinations.        8/13/24: Patient's wife reports that he was hospitalized recently for confusion and not recognizing his wife. This occurred after lowering his carbidopa/levodopa down to 2 tabs 3 times daily. Given that the dose was recently lowered, no further dose reduction will be maed. I have written a Rx for donepezil 5mg qHS for treatment of Parkinson dementia     8/23/24: He loses concentration if he is reading a book or magazine. He recently started donepezil. He gets lost in conversation occasionally. He has been improving since his hospitalization for confusion/not recognizing his wife.     Past medical history:   Past Medical History:   Diagnosis Date    Abnormal cardiac function test 08/2017    MPI with moderate fixed defect inferior wall, LVEF 44%.    Adverse effect of anesthesia     pt needs spinal only with less sedation than  general per his wife    Back pain     BPH (benign prostatic hyperplasia)     Followed by urology ([place])    Cardiac pacemaker in situ - [employer] 2018     Dual chamber, [employer] model, placed by Dr. Simpson on 3/13/18 for AF with SVR & pauses    Chronic anticoagulation     Chronic atrial fibrillation (HCC) 07/2017    Echocardiogram with normal LV size, mild septal hypertrophy, LVEF 50%. Inferior/apical hypokinesis. Moderately dilated RV. Enlarged RA and LA. Mild MR, mild TR. RVSP 20mmHg. Ascending aorta 3.8cm    Chronic kidney disease     Followed by nephrology ([place])    Hematuria     History of MI (myocardial infarction) - inferior     Hyperlipidemia     Hypertension     Injury 03/25/2021    Controlled fall in Imaging     Knee pain     Pacemaker     Visit for monitoring Tikosyn therapy        Past surgical history:   Past Surgical History:   Procedure Laterality Date    PB TOTAL KNEE ARTHROPLASTY Left 4/6/2021    Procedure: LEFT TOTAL KNEE ARTHROPLASTY;  Surgeon: Leon Shah M.D.;  Location: SURGERY [facility];  Service: Orthopedics    PACEMAKER INSERTION  03/13/2018    PROSTATECTOMY, RADIAL  2005    HEMORRHOIDECTOMY  1982    OTHER ORTHOPEDIC SURGERY Right     total knee       Family history:   Family History   Problem Relation Age of Onset    Cancer Mother     Heart Disease Father     Cancer Sister     Cancer Brother        Social history:   Social History     Socioeconomic History    Marital status:      Spouse name: Not on file    Number of children: Not on file    Years of education: Not on file    Highest education level: Not on file   Occupational History    Not on file   Tobacco Use    Smoking status: Never    Smokeless tobacco: Never   Vaping Use    Vaping status: Never Used   Substance and Sexual Activity    Alcohol use: Not Currently     Alcohol/week: 1.0 oz     Types: 2 Standard drinks or equivalent per week    Drug use: No    Sexual activity: Not on file   Other Topics  Concern    Not on file   Social History Narrative    Not on file     Social Determinants of Health     Financial Resource Strain: Unknown (3/31/2021)    Received from [facility]    Overall Financial Resource Strain (CARDIA)     Difficulty of Paying Living Expenses: Patient declined   Food Insecurity: Unknown (1/3/2024)    Received from [facility]    Food Insecurity     In the past 3 months, have you had to go without food for 24 hours, multiple times due to lack of resources?: Not on file   Transportation Needs: Unknown (1/3/2024)    Received from [facility]    Transportation Needs     In the past 3 months, has lack of transporation kept you from medical appointments or getting things you need that are essential to your health?: Not on file   Physical Activity: Unknown (3/31/2021)    Received from [facility]    Exercise Vital Sign     Days of Exercise per Week: Patient declined     Minutes of Exercise per Session: Patient declined   Stress: Unknown (3/31/2021)    Received from [facility]    [employer] - Occupational Stress Questionnaire     Feeling of Stress : Patient declined   Social Connections: Unknown (2/22/2024)    Received from [facility]    Social Connections     In the past 3 months, do you feel that you lack companionship or social support?: Not on file   Intimate Partner Violence: Low Risk  (5/22/2023)    Received from [facility]    History of Abuse     History of Abuse: Denies   Housing Stability: Not on file       Current medications:   Current Outpatient Medications   Medication    levothyroxine (SYNTHROID) 75 MCG Tab    donepezil (ARICEPT) 5 MG Tab    carbidopa-levodopa (SINEMET)  MG Tab    doxazosin (CARDURA) 4 MG Tab    entacapone (COMTAN) 200 MG Tab    finasteride  "(PROSCAR) 5 MG Tab    mirtazapine (REMERON) 15 MG Tab    aspirin (ASA) 325 MG Tab    magnesium oxide (MAG-OX) 400 MG Tab tablet    amiodarone (CORDARONE) 200 MG Tab    lisinopril (PRINIVIL) 20 MG Tab    phosphorus (K-PHOS-NEUTRAL, PHOSPHA 250 NEUTRAL) 155-852-130 MG tablet    potassium chloride ER (KLOR-CON) 10 MEQ tablet     No current facility-administered medications for this visit.       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 Parkinson's disease without dyskinesia or fluctuating manifestations (HCC)    2. 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  He had an episode where he was unable to recognize his wife and was brought to the hospital.  This occurred despite lowering his dose of carbidopa levodopa down from 300 mg to 200 mg 3 times daily.  I had lowered the dose by one third, due to hallucinations.  Recently, he was started on donepezil, which has only been taken for 10 days.  I will continue the current course.  Continue the 5 mg dose of donepezil with plans to increase to 10 mg after 1 month.    FOLLOW-UP:   Return in about 3 months (around 11/23/2024).    Total time spent for the day for this patient unrelated to procedure time is: 25 minutes. I spent 19 minutes in face to face time and I spent 1 minutes pre-charting and 5 minutes in post-visit documentation.      Dr. Gagandeep Gar D.O.  [facility]  Movement Disorders Specialist    "

## 2024-11-13 ENCOUNTER — APPOINTMENT (OUTPATIENT)
Dept: NEUROLOGY | Facility: MEDICAL CENTER | Age: 84
End: 2024-11-13
Attending: PSYCHIATRY & NEUROLOGY
Payer: MEDICARE

## 2024-11-28 ENCOUNTER — PHARMACY VISIT (OUTPATIENT)
Dept: PHARMACY | Facility: MEDICAL CENTER | Age: 84
End: 2024-11-28
Payer: COMMERCIAL

## 2024-11-28 PROCEDURE — RXMED WILLOW AMBULATORY MEDICATION CHARGE: Performed by: INTERNAL MEDICINE

## 2024-11-28 RX ORDER — LACTULOSE 10 G/15ML
SOLUTION ORAL
Qty: 473 ML | Refills: 0 | OUTPATIENT
Start: 2024-11-28

## 2024-11-28 RX ORDER — MORPHINE SULFATE 20 MG/ML
SOLUTION ORAL
Qty: 30 ML | Refills: 0 | OUTPATIENT
Start: 2024-11-28

## 2024-11-28 RX ORDER — LORAZEPAM 2 MG/ML
CONCENTRATE ORAL
Qty: 30 ML | Refills: 0 | OUTPATIENT
Start: 2024-11-28

## 2024-12-04 ENCOUNTER — TELEPHONE (OUTPATIENT)
Dept: NEUROLOGY | Facility: MEDICAL CENTER | Age: 84
End: 2024-12-04
Payer: MEDICARE

## 2024-12-04 NOTE — TELEPHONE ENCOUNTER
Patient's wife called to inform that the patient is in hospice care. She said for any questions you can contact her.

## 2024-12-11 ENCOUNTER — APPOINTMENT (OUTPATIENT)
Dept: NEUROLOGY | Facility: MEDICAL CENTER | Age: 84
End: 2024-12-11
Attending: PSYCHIATRY & NEUROLOGY
Payer: MEDICARE

## 2024-12-11 ENCOUNTER — TELEPHONE (OUTPATIENT)
Dept: HEALTH INFORMATION MANAGEMENT | Facility: OTHER | Age: 84
End: 2024-12-11
Payer: MEDICARE